# Patient Record
Sex: FEMALE | Race: WHITE | NOT HISPANIC OR LATINO | Employment: OTHER | URBAN - METROPOLITAN AREA
[De-identification: names, ages, dates, MRNs, and addresses within clinical notes are randomized per-mention and may not be internally consistent; named-entity substitution may affect disease eponyms.]

---

## 2023-06-09 ENCOUNTER — APPOINTMENT (OUTPATIENT)
Dept: CT IMAGING | Facility: CLINIC | Age: 78
DRG: 085 | End: 2023-06-09
Attending: EMERGENCY MEDICINE
Payer: MEDICARE

## 2023-06-09 ENCOUNTER — HOSPITAL ENCOUNTER (INPATIENT)
Facility: CLINIC | Age: 78
LOS: 1 days | Discharge: OTHER INSTITUTION | DRG: 085 | End: 2023-06-10
Attending: EMERGENCY MEDICINE | Admitting: INTERNAL MEDICINE
Payer: MEDICARE

## 2023-06-09 ENCOUNTER — APPOINTMENT (OUTPATIENT)
Dept: GENERAL RADIOLOGY | Facility: CLINIC | Age: 78
DRG: 085 | End: 2023-06-09
Attending: EMERGENCY MEDICINE
Payer: MEDICARE

## 2023-06-09 ENCOUNTER — HOSPITAL ENCOUNTER (EMERGENCY)
Facility: CLINIC | Age: 78
Discharge: HOME | DRG: 085 | End: 2023-06-09
Payer: MEDICARE

## 2023-06-09 DIAGNOSIS — S00.83XA CONTUSION OF FACE, INITIAL ENCOUNTER: ICD-10-CM

## 2023-06-09 DIAGNOSIS — S02.30XA ORBITAL FLOOR (BLOW-OUT) CLOSED FRACTURE (H): ICD-10-CM

## 2023-06-09 DIAGNOSIS — I60.9 SUBARACHNOID HEMORRHAGE (H): ICD-10-CM
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
## 2023-06-09 LAB
ABO/RH(D): NORMAL
ANION GAP SERPL CALCULATED.3IONS-SCNC: 13 MMOL/L (ref 7–15)
ANTIBODY SCREEN: NEGATIVE
BASOPHILS # BLD AUTO: 0.1 10E3/UL (ref 0–0.2)
BASOPHILS NFR BLD AUTO: 2 %
BUN SERPL-MCNC: 24.6 MG/DL (ref 8–23)
CALCIUM SERPL-MCNC: 9.6 MG/DL (ref 8.8–10.2)
CHLORIDE SERPL-SCNC: 98 MMOL/L (ref 98–107)
CREAT SERPL-MCNC: 1.34 MG/DL (ref 0.51–0.95)
DEPRECATED HCO3 PLAS-SCNC: 24 MMOL/L (ref 22–29)
EOSINOPHIL # BLD AUTO: 0 10E3/UL (ref 0–0.7)
EOSINOPHIL NFR BLD AUTO: 1 %
ERYTHROCYTE [DISTWIDTH] IN BLOOD BY AUTOMATED COUNT: 12.7 % (ref 10–15)
GFR SERPL CREATININE-BSD FRML MDRD: 41 ML/MIN/1.73M2
GLUCOSE SERPL-MCNC: 118 MG/DL (ref 70–99)
HCT VFR BLD AUTO: 29.7 % (ref 35–47)
HGB BLD-MCNC: 10.4 G/DL (ref 11.7–15.7)
IMM GRANULOCYTES # BLD: 0 10E3/UL
IMM GRANULOCYTES NFR BLD: 0 %
INR PPP: 1.03 (ref 0.85–1.15)
LYMPHOCYTES # BLD AUTO: 0.6 10E3/UL (ref 0.8–5.3)
LYMPHOCYTES NFR BLD AUTO: 19 %
MCH RBC QN AUTO: 42.1 PG (ref 26.5–33)
MCHC RBC AUTO-ENTMCNC: 35 G/DL (ref 31.5–36.5)
MCV RBC AUTO: 120 FL (ref 78–100)
MONOCYTES # BLD AUTO: 0.8 10E3/UL (ref 0–1.3)
MONOCYTES NFR BLD AUTO: 23 %
NEUTROPHILS # BLD AUTO: 1.8 10E3/UL (ref 1.6–8.3)
NEUTROPHILS NFR BLD AUTO: 55 %
NRBC # BLD AUTO: 0 10E3/UL
NRBC BLD AUTO-RTO: 0 /100
PLAT MORPH BLD: NORMAL
PLATELET # BLD AUTO: 104 10E3/UL (ref 150–450)
POTASSIUM SERPL-SCNC: 3.9 MMOL/L (ref 3.4–5.3)
RBC # BLD AUTO: 2.47 10E6/UL (ref 3.8–5.2)
RBC MORPH BLD: NORMAL
SODIUM SERPL-SCNC: 135 MMOL/L (ref 136–145)
SPECIMEN EXPIRATION DATE: NORMAL
WBC # BLD AUTO: 3.3 10E3/UL (ref 4–11)

## 2023-06-09 PROCEDURE — 71045 X-RAY EXAM CHEST 1 VIEW: CPT

## 2023-06-09 PROCEDURE — 120N000001 HC R&B MED SURG/OB

## 2023-06-09 PROCEDURE — 72170 X-RAY EXAM OF PELVIS: CPT

## 2023-06-09 PROCEDURE — 70450 CT HEAD/BRAIN W/O DYE: CPT

## 2023-06-09 PROCEDURE — 86850 RBC ANTIBODY SCREEN: CPT | Performed by: EMERGENCY MEDICINE

## 2023-06-09 PROCEDURE — 250N000011 HC RX IP 250 OP 636: Performed by: PHYSICIAN ASSISTANT

## 2023-06-09 PROCEDURE — 85610 PROTHROMBIN TIME: CPT | Performed by: EMERGENCY MEDICINE

## 2023-06-09 PROCEDURE — 72125 CT NECK SPINE W/O DYE: CPT

## 2023-06-09 PROCEDURE — 85025 COMPLETE CBC W/AUTO DIFF WBC: CPT | Performed by: EMERGENCY MEDICINE

## 2023-06-09 PROCEDURE — 250N000013 HC RX MED GY IP 250 OP 250 PS 637: Performed by: PHYSICIAN ASSISTANT

## 2023-06-09 PROCEDURE — 73560 X-RAY EXAM OF KNEE 1 OR 2: CPT | Mod: RT

## 2023-06-09 PROCEDURE — 86901 BLOOD TYPING SEROLOGIC RH(D): CPT | Performed by: EMERGENCY MEDICINE

## 2023-06-09 PROCEDURE — 96375 TX/PRO/DX INJ NEW DRUG ADDON: CPT

## 2023-06-09 PROCEDURE — 258N000003 HC RX IP 258 OP 636: Performed by: PHYSICIAN ASSISTANT

## 2023-06-09 PROCEDURE — 250N000011 HC RX IP 250 OP 636: Performed by: EMERGENCY MEDICINE

## 2023-06-09 PROCEDURE — 99223 1ST HOSP IP/OBS HIGH 75: CPT | Performed by: PHYSICIAN ASSISTANT

## 2023-06-09 PROCEDURE — 80048 BASIC METABOLIC PNL TOTAL CA: CPT | Performed by: EMERGENCY MEDICINE

## 2023-06-09 PROCEDURE — 36415 COLL VENOUS BLD VENIPUNCTURE: CPT | Performed by: EMERGENCY MEDICINE

## 2023-06-09 PROCEDURE — 99418 PROLNG IP/OBS E/M EA 15 MIN: CPT | Performed by: PHYSICIAN ASSISTANT

## 2023-06-09 PROCEDURE — 70486 CT MAXILLOFACIAL W/O DYE: CPT

## 2023-06-09 PROCEDURE — 99291 CRITICAL CARE FIRST HOUR: CPT | Mod: 25

## 2023-06-09 PROCEDURE — 72131 CT LUMBAR SPINE W/O DYE: CPT

## 2023-06-09 PROCEDURE — 96374 THER/PROPH/DIAG INJ IV PUSH: CPT

## 2023-06-09 PROCEDURE — 96376 TX/PRO/DX INJ SAME DRUG ADON: CPT

## 2023-06-09 RX ORDER — HYDROMORPHONE HYDROCHLORIDE 1 MG/ML
.3-.5 INJECTION, SOLUTION INTRAMUSCULAR; INTRAVENOUS; SUBCUTANEOUS
Status: DISCONTINUED | OUTPATIENT
Start: 2023-06-09 | End: 2023-06-09

## 2023-06-09 RX ORDER — ATENOLOL 25 MG/1
25 TABLET ORAL DAILY
COMMUNITY

## 2023-06-09 RX ORDER — LOSARTAN POTASSIUM 50 MG/1
50 TABLET ORAL DAILY
COMMUNITY

## 2023-06-09 RX ORDER — CALCIUM CARBONATE 500 MG/1
1000 TABLET, CHEWABLE ORAL 4 TIMES DAILY PRN
Status: DISCONTINUED | OUTPATIENT
Start: 2023-06-09 | End: 2023-06-10 | Stop reason: HOSPADM

## 2023-06-09 RX ORDER — LIDOCAINE 4 G/G
2 PATCH TOPICAL
Status: DISCONTINUED | OUTPATIENT
Start: 2023-06-09 | End: 2023-06-10 | Stop reason: HOSPADM

## 2023-06-09 RX ORDER — AMOXICILLIN 250 MG
1 CAPSULE ORAL 2 TIMES DAILY PRN
Status: DISCONTINUED | OUTPATIENT
Start: 2023-06-09 | End: 2023-06-10 | Stop reason: HOSPADM

## 2023-06-09 RX ORDER — DOXYCYCLINE 100 MG/1
100 CAPSULE ORAL 2 TIMES DAILY
COMMUNITY
End: 2023-06-09

## 2023-06-09 RX ORDER — LIDOCAINE 40 MG/G
CREAM TOPICAL
Status: DISCONTINUED | OUTPATIENT
Start: 2023-06-09 | End: 2023-06-10 | Stop reason: HOSPADM

## 2023-06-09 RX ORDER — GLUCOSAMINE/D3/BOSWELLIA SERRA 1500MG-400
1 TABLET ORAL 2 TIMES DAILY
Status: DISCONTINUED | OUTPATIENT
Start: 2023-06-09 | End: 2023-06-10 | Stop reason: HOSPADM

## 2023-06-09 RX ORDER — ACETAMINOPHEN 325 MG/1
650 TABLET ORAL EVERY 6 HOURS PRN
Status: DISCONTINUED | OUTPATIENT
Start: 2023-06-09 | End: 2023-06-10 | Stop reason: HOSPADM

## 2023-06-09 RX ORDER — VITAMIN B COMPLEX
25 TABLET ORAL DAILY
Status: DISCONTINUED | OUTPATIENT
Start: 2023-06-10 | End: 2023-06-10 | Stop reason: HOSPADM

## 2023-06-09 RX ORDER — ONDANSETRON 2 MG/ML
4 INJECTION INTRAMUSCULAR; INTRAVENOUS EVERY 6 HOURS PRN
Status: DISCONTINUED | OUTPATIENT
Start: 2023-06-09 | End: 2023-06-10 | Stop reason: HOSPADM

## 2023-06-09 RX ORDER — ONDANSETRON 2 MG/ML
4 INJECTION INTRAMUSCULAR; INTRAVENOUS ONCE
Status: COMPLETED | OUTPATIENT
Start: 2023-06-09 | End: 2023-06-09

## 2023-06-09 RX ORDER — ATENOLOL 25 MG/1
25 TABLET ORAL DAILY
Status: DISCONTINUED | OUTPATIENT
Start: 2023-06-10 | End: 2023-06-10 | Stop reason: HOSPADM

## 2023-06-09 RX ORDER — MULTIVITAMIN WITH IRON
250 TABLET ORAL DAILY
Status: DISCONTINUED | OUTPATIENT
Start: 2023-06-10 | End: 2023-06-10 | Stop reason: HOSPADM

## 2023-06-09 RX ORDER — SODIUM CHLORIDE, SODIUM LACTATE, POTASSIUM CHLORIDE, CALCIUM CHLORIDE 600; 310; 30; 20 MG/100ML; MG/100ML; MG/100ML; MG/100ML
INJECTION, SOLUTION INTRAVENOUS CONTINUOUS
Status: DISCONTINUED | OUTPATIENT
Start: 2023-06-09 | End: 2023-06-10 | Stop reason: HOSPADM

## 2023-06-09 RX ORDER — VERAPAMIL HYDROCHLORIDE 180 MG/1
180 TABLET, EXTENDED RELEASE ORAL AT BEDTIME
Status: DISCONTINUED | OUTPATIENT
Start: 2023-06-09 | End: 2023-06-10 | Stop reason: HOSPADM

## 2023-06-09 RX ORDER — HYDROMORPHONE HCL IN WATER/PF 6 MG/30 ML
0.2 PATIENT CONTROLLED ANALGESIA SYRINGE INTRAVENOUS
Status: DISCONTINUED | OUTPATIENT
Start: 2023-06-09 | End: 2023-06-10 | Stop reason: HOSPADM

## 2023-06-09 RX ORDER — HYDROMORPHONE HYDROCHLORIDE 2 MG/1
2 TABLET ORAL EVERY 4 HOURS PRN
Status: DISCONTINUED | OUTPATIENT
Start: 2023-06-09 | End: 2023-06-10 | Stop reason: HOSPADM

## 2023-06-09 RX ORDER — ONDANSETRON 4 MG/1
4 TABLET, ORALLY DISINTEGRATING ORAL EVERY 6 HOURS PRN
Status: DISCONTINUED | OUTPATIENT
Start: 2023-06-09 | End: 2023-06-10 | Stop reason: HOSPADM

## 2023-06-09 RX ORDER — LANOLIN ALCOHOL/MO/W.PET/CERES
1000 CREAM (GRAM) TOPICAL AT BEDTIME
Status: DISCONTINUED | OUTPATIENT
Start: 2023-06-09 | End: 2023-06-10 | Stop reason: HOSPADM

## 2023-06-09 RX ORDER — POLYETHYLENE GLYCOL 3350 17 G/17G
17 POWDER, FOR SOLUTION ORAL DAILY PRN
Status: DISCONTINUED | OUTPATIENT
Start: 2023-06-09 | End: 2023-06-10 | Stop reason: HOSPADM

## 2023-06-09 RX ORDER — VERAPAMIL HYDROCHLORIDE 180 MG/1
180 TABLET, EXTENDED RELEASE ORAL AT BEDTIME
COMMUNITY

## 2023-06-09 RX ORDER — LANOLIN ALCOHOL/MO/W.PET/CERES
800 CREAM (GRAM) TOPICAL 2 TIMES DAILY
Status: DISCONTINUED | OUTPATIENT
Start: 2023-06-09 | End: 2023-06-10 | Stop reason: HOSPADM

## 2023-06-09 RX ORDER — LOSARTAN POTASSIUM 50 MG/1
50 TABLET ORAL AT BEDTIME
Status: DISCONTINUED | OUTPATIENT
Start: 2023-06-09 | End: 2023-06-10 | Stop reason: HOSPADM

## 2023-06-09 RX ORDER — FERROUS SULFATE 325(65) MG
325 TABLET ORAL 2 TIMES DAILY
Status: DISCONTINUED | OUTPATIENT
Start: 2023-06-10 | End: 2023-06-10 | Stop reason: HOSPADM

## 2023-06-09 RX ORDER — TRIAMTERENE/HYDROCHLOROTHIAZID 37.5-25 MG
1 TABLET ORAL DAILY
COMMUNITY

## 2023-06-09 RX ORDER — CHLORAL HYDRATE 500 MG
1 CAPSULE ORAL EVERY MORNING
COMMUNITY

## 2023-06-09 RX ORDER — PROCHLORPERAZINE MALEATE 5 MG
5 TABLET ORAL EVERY 6 HOURS PRN
Status: DISCONTINUED | OUTPATIENT
Start: 2023-06-09 | End: 2023-06-10 | Stop reason: HOSPADM

## 2023-06-09 RX ORDER — ASPIRIN 81 MG/1
81 TABLET ORAL DAILY
Status: ON HOLD | COMMUNITY
End: 2023-06-12

## 2023-06-09 RX ORDER — ACETAMINOPHEN 650 MG/1
650 SUPPOSITORY RECTAL EVERY 6 HOURS PRN
Status: DISCONTINUED | OUTPATIENT
Start: 2023-06-09 | End: 2023-06-10 | Stop reason: HOSPADM

## 2023-06-09 RX ORDER — ASCORBIC ACID 500 MG
500 TABLET ORAL AT BEDTIME
Status: DISCONTINUED | OUTPATIENT
Start: 2023-06-09 | End: 2023-06-10 | Stop reason: HOSPADM

## 2023-06-09 RX ORDER — MULTIVIT-MIN/IRON/FOLIC ACID/K 18-600-40
500 CAPSULE ORAL AT BEDTIME
COMMUNITY

## 2023-06-09 RX ORDER — LABETALOL HYDROCHLORIDE 5 MG/ML
10 INJECTION, SOLUTION INTRAVENOUS
Status: DISCONTINUED | OUTPATIENT
Start: 2023-06-09 | End: 2023-06-10 | Stop reason: HOSPADM

## 2023-06-09 RX ORDER — GLUCOSAMINE/D3/BOSWELLIA SERRA 1500MG-400
1 TABLET ORAL 2 TIMES DAILY
COMMUNITY

## 2023-06-09 RX ORDER — MULTIVITAMIN WITH IRON
1 TABLET ORAL DAILY
COMMUNITY

## 2023-06-09 RX ORDER — PROCHLORPERAZINE 25 MG
12.5 SUPPOSITORY, RECTAL RECTAL EVERY 12 HOURS PRN
Status: DISCONTINUED | OUTPATIENT
Start: 2023-06-09 | End: 2023-06-10 | Stop reason: HOSPADM

## 2023-06-09 RX ORDER — HYDROMORPHONE HCL IN WATER/PF 6 MG/30 ML
0.4 PATIENT CONTROLLED ANALGESIA SYRINGE INTRAVENOUS
Status: DISCONTINUED | OUTPATIENT
Start: 2023-06-09 | End: 2023-06-10 | Stop reason: HOSPADM

## 2023-06-09 RX ORDER — TETRACAINE HYDROCHLORIDE 5 MG/ML
SOLUTION OPHTHALMIC
Status: DISCONTINUED
Start: 2023-06-09 | End: 2023-06-10 | Stop reason: HOSPADM

## 2023-06-09 RX ORDER — FERROUS SULFATE 325(65) MG
325 TABLET ORAL 2 TIMES DAILY
COMMUNITY

## 2023-06-09 RX ORDER — AMOXICILLIN 250 MG
2 CAPSULE ORAL 2 TIMES DAILY PRN
Status: DISCONTINUED | OUTPATIENT
Start: 2023-06-09 | End: 2023-06-10 | Stop reason: HOSPADM

## 2023-06-09 RX ORDER — TETRACAINE HYDROCHLORIDE 5 MG/ML
SOLUTION OPHTHALMIC
Status: COMPLETED
Start: 2023-06-09 | End: 2023-06-09

## 2023-06-09 RX ORDER — UBIDECARENONE 100 MG
100 CAPSULE ORAL DAILY
COMMUNITY

## 2023-06-09 RX ORDER — LEVETIRACETAM 500 MG/1
500 TABLET ORAL 2 TIMES DAILY
Status: DISCONTINUED | OUTPATIENT
Start: 2023-06-09 | End: 2023-06-10 | Stop reason: HOSPADM

## 2023-06-09 RX ORDER — LANOLIN ALCOHOL/MO/W.PET/CERES
1000 CREAM (GRAM) TOPICAL DAILY
COMMUNITY

## 2023-06-09 RX ORDER — ONDANSETRON 2 MG/ML
4 INJECTION INTRAMUSCULAR; INTRAVENOUS EVERY 30 MIN PRN
Status: DISCONTINUED | OUTPATIENT
Start: 2023-06-09 | End: 2023-06-09

## 2023-06-09 RX ORDER — DOXYCYCLINE 100 MG/1
100 CAPSULE ORAL DAILY PRN
COMMUNITY

## 2023-06-09 RX ADMIN — CYANOCOBALAMIN TAB 1000 MCG 1000 MCG: 1000 TAB at 23:08

## 2023-06-09 RX ADMIN — HYDROMORPHONE HYDROCHLORIDE 0.5 MG: 1 INJECTION, SOLUTION INTRAMUSCULAR; INTRAVENOUS; SUBCUTANEOUS at 16:49

## 2023-06-09 RX ADMIN — LOSARTAN POTASSIUM 50 MG: 50 TABLET, FILM COATED ORAL at 22:54

## 2023-06-09 RX ADMIN — FOLIC ACID TAB 400 MCG 800 MCG: 400 TAB at 22:54

## 2023-06-09 RX ADMIN — HYDROMORPHONE HYDROCHLORIDE 0.4 MG: 0.2 INJECTION, SOLUTION INTRAMUSCULAR; INTRAVENOUS; SUBCUTANEOUS at 21:54

## 2023-06-09 RX ADMIN — AMOXICILLIN AND CLAVULANATE POTASSIUM 1 TABLET: 875; 125 TABLET, FILM COATED ORAL at 22:54

## 2023-06-09 RX ADMIN — LEVETIRACETAM 500 MG: 500 TABLET, FILM COATED ORAL at 21:29

## 2023-06-09 RX ADMIN — SODIUM CHLORIDE, POTASSIUM CHLORIDE, SODIUM LACTATE AND CALCIUM CHLORIDE: 600; 310; 30; 20 INJECTION, SOLUTION INTRAVENOUS at 22:08

## 2023-06-09 RX ADMIN — VERAPAMIL HYDROCHLORIDE 180 MG: 180 TABLET, FILM COATED, EXTENDED RELEASE ORAL at 23:39

## 2023-06-09 RX ADMIN — OXYCODONE HYDROCHLORIDE AND ACETAMINOPHEN 500 MG: 500 TABLET ORAL at 23:38

## 2023-06-09 RX ADMIN — LIDOCAINE PATCH 4% 2 PATCH: 40 PATCH TOPICAL at 22:54

## 2023-06-09 RX ADMIN — HYDROMORPHONE HYDROCHLORIDE 0.5 MG: 1 INJECTION, SOLUTION INTRAMUSCULAR; INTRAVENOUS; SUBCUTANEOUS at 18:37

## 2023-06-09 ASSESSMENT — ACTIVITIES OF DAILY LIVING (ADL)
ADLS_ACUITY_SCORE: 35
ADLS_ACUITY_SCORE: 36
ADLS_ACUITY_SCORE: 35

## 2023-06-09 NOTE — ED TRIAGE NOTES
Pt reports that she tripped and fell in her driveway while getting the mail. Pt neighbor called 911 because they saw pt laying I the driveway. Pt isn't sure how long she was on the ground. Pt denies LOC or vomiting. Pt  Has swelling to her face. Dried blood to face. EMS placed C collar. Pt alert. Family present in room.     Pt family reports that pt has hx breast cancer and has chemo every month.

## 2023-06-09 NOTE — ED NOTES
Bed: [facility]  Expected date:   Expected time:   Means of arrival:   Comments:  Ceferino a randi

## 2023-06-09 NOTE — ED PROVIDER NOTES
History     Chief Complaint:  Fall and Facial Injury       The history is provided by the patient and a relative.      Argelia Villarreal is a 78 year old female with a history or stage 4 metastatic breast cancer currently on chemotherapy who presents with two family member via EMS after a fall. Patient was getting her mail today when she fell in her driveway face first. Her pain is most intense in her face and right knee. Pain is 10/10. She denies pain to her arms, chest, or abdomen. Patient denies new back pain. She feels as though her teeth align. Patient notes new onset of generalized weakness since the fall. She denies numbness or tingling to her bilateral extremities. Patient lives alone. She endorses drinking a glass of wine at night and denies tobacco use. Patient is on a course of Ibrance that she takes 21 days on and 7 days off, and her female  notes that she is currently on the 7 days off. She takes a baby aspirin for anticoagulation.    Independent Historian:   Female relative at bedside reports history as stated above.    Review of External Notes:   See MDM    Medications:    Atenolol   Losartan   Palbociclib   Triamterene-HCTZ  Verapamil  Ibrance     Past Medical History:    Metastatic breast cancer     Physical Exam     Patient Vitals for the past 24 hrs:   BP Temp Temp src Pulse Resp SpO2   06/09/23 2000 125/56 -- -- 64 11 100 %   06/09/23 1930 133/55 -- -- 64 12 99 %   06/09/23 1925 -- -- -- 61 12 94 %   06/09/23 1924 -- -- -- 62 12 93 %   06/09/23 1923 -- -- -- 62 11 91 %   06/09/23 1922 -- -- -- 63 12 94 %   06/09/23 1921 -- -- -- 67 13 93 %   06/09/23 1920 -- -- -- 68 12 95 %   06/09/23 1919 -- -- -- 68 11 95 %   06/09/23 1918 -- -- -- 67 12 94 %   06/09/23 1915 (!) 152/60 -- -- 68 13 93 %   06/09/23 1900 -- -- -- 67 10 94 %   06/09/23 1845 -- -- -- 74 11 95 %   06/09/23 1830 -- -- -- 79 14 96 %   06/09/23 1826 (!) 164/70 -- -- 69 -- 93 %   06/09/23 1700 139/66 -- -- 63 -- 93 %    06/09/23 1608 (!) 162/67 -- -- 67 -- 94 %   06/09/23 1603 -- 98  F (36.7  C) Temporal -- -- --   06/09/23 1602 -- -- -- 68 -- --   06/09/23 1600 (!) 185/42 -- -- -- 18 95 %   06/09/23 1559 -- -- -- -- -- 94 %        Physical Exam  Vital signs reviewed.  Nursing note reviewed.  Constitutional: Well-developed, Well-nourished.  Non-diaphoretic.  Mild distress    HENT: Significant left facial trauma with left periorbital and maxillary swelling, abrasions on nasal bridge and forehead.  Mouth without any obvious lacerations or tooth injuries  EYES:  PERRL.  EOMI, no obvious entrapment on exam, no evidence of globe rupture, L eye 15 mmHg x2  NECK:  No Cspine tenderness.  Pain with attempted ROM  CARDIAC:  RRR. 2+ bilat radial pulses   CHEST:  chest NT  PULM: Effort  Normal.  Breath sounds clear and equal bilat  ABD:  Soft, NT/ND  No guarding, no rebound.  No external evidence of abdominal trauma  : No CVA T.    BACK:  No T or L spinous process tenderness.  Pelvis stable.  EXT:  Full ROM X4.  No tenderness, edema, crepitus or obvious deformity other than that noted below              RLE: Range of motion some limited by pain, anterior knee tenderness without obvious deformity, distal CMS intact  NEURO:  Alert, Oriented.  Strength testing symmetrical, 5/5 bilateral  strength, dorsi and plantarflexion.  Limited hip flexion strength testing secondary to pain. Sensation intact to LT.   SKIN :  Warm.  Dry.   No erythema.  No rash  PSYCH.:  Normal judgment.  Normal affect.      Emergency Department Course     Imaging:  XR Chest 1 View   Final Result   IMPRESSION: Diffuse sclerosis of the visualized bony skeleton. Advanced degenerative changes most marked in the right shoulder. Mild thoracic spinal curvature. Surgical clips projected over the lateral aspect of the mid right chest wall. No obvious acute    finding such as pneumothorax or displaced bony fracture.      XR Pelvis 1/2 Views   Final Result   IMPRESSION: Diffuse  sclerosis and heterogeneity of the bone is consistent with the patient's known history of metastatic breast cancer. There is a left total hip replacement. There is no evidence of an acute displaced fracture or dislocation on the    single AP view. Atherosclerotic vascular calcifications.      XR Knee Right 1/2 Views   Final Result   IMPRESSION: Diffuse sclerosis and heterogeneity of the bones consistent with the patient's known history of metastatic breast cancer. Mild tricompartmental degenerative changes of the right knee. No evidence of a fracture or joint effusion.    Atherosclerotic vascular calcifications.      Lumbar spine CT w/o contrast   Final Result   IMPRESSION:   1.  No acute lumbar spine fracture.   2.  Widespread blastic metastases.   3.  Degenerative changes with multilevel severe canal stenosis, as detailed above.      Cervical spine CT w/o contrast   Final Result   IMPRESSION:   HEAD CT:   1.  Scattered small volume subarachnoid hemorrhage.    2.  No mass effect, midline shift or calvarial fracture.      FACIAL BONE CT:   1.  Left orbital floor blowout fracture with distorted appearance of the left inferior rectus muscle. This could be related to intramuscular injury or herniation. Clinical correlation for extraocular muscle entrapment is recommended.   2.  Small volume of extraconal hemorrhage along the floor of the left orbit. This does not extend to the orbital apex.   3.  Posttraumatic hemorrhage in the left maxillary sinus and left periorbital soft tissue hematoma.      CERVICAL SPINE CT:   1.  No acute cervical spine fracture.   2.  Widespread osseous metastases.      Critical Result: Acute intracranial hemorrhage and possible left inferior rectus muscle entrapment      Finding was identified on 6/9/2023 6:08 PM CDT.      Dr. Drew Henry was contacted by me on 6/9/2023 6:14 PM CDT.      CT Facial Bones without Contrast   Final Result   IMPRESSION:   HEAD CT:   1.  Scattered small  volume subarachnoid hemorrhage.    2.  No mass effect, midline shift or calvarial fracture.      FACIAL BONE CT:   1.  Left orbital floor blowout fracture with distorted appearance of the left inferior rectus muscle. This could be related to intramuscular injury or herniation. Clinical correlation for extraocular muscle entrapment is recommended.   2.  Small volume of extraconal hemorrhage along the floor of the left orbit. This does not extend to the orbital apex.   3.  Posttraumatic hemorrhage in the left maxillary sinus and left periorbital soft tissue hematoma.      CERVICAL SPINE CT:   1.  No acute cervical spine fracture.   2.  Widespread osseous metastases.      Critical Result: Acute intracranial hemorrhage and possible left inferior rectus muscle entrapment      Finding was identified on 6/9/2023 6:08 PM CDT.      Dr. Drew Henry was contacted by me on 6/9/2023 6:14 PM CDT.      Head CT w/o contrast   Final Result   IMPRESSION:   HEAD CT:   1.  Scattered small volume subarachnoid hemorrhage.    2.  No mass effect, midline shift or calvarial fracture.      FACIAL BONE CT:   1.  Left orbital floor blowout fracture with distorted appearance of the left inferior rectus muscle. This could be related to intramuscular injury or herniation. Clinical correlation for extraocular muscle entrapment is recommended.   2.  Small volume of extraconal hemorrhage along the floor of the left orbit. This does not extend to the orbital apex.   3.  Posttraumatic hemorrhage in the left maxillary sinus and left periorbital soft tissue hematoma.      CERVICAL SPINE CT:   1.  No acute cervical spine fracture.   2.  Widespread osseous metastases.      Critical Result: Acute intracranial hemorrhage and possible left inferior rectus muscle entrapment      Finding was identified on 6/9/2023 6:08 PM CDT.      Dr. Drew Henry was contacted by me on 6/9/2023 6:14 PM CDT.         Report per radiology    Laboratory:  Labs  Ordered and Resulted from Time of ED Arrival to Time of ED Departure   BASIC METABOLIC PANEL - Abnormal       Result Value    Sodium 135 (*)     Potassium 3.9      Chloride 98      Carbon Dioxide (CO2) 24      Anion Gap 13      Urea Nitrogen 24.6 (*)     Creatinine 1.34 (*)     Calcium 9.6      Glucose 118 (*)     GFR Estimate 41 (*)    CBC WITH PLATELETS AND DIFFERENTIAL - Abnormal    WBC Count 3.3 (*)     RBC Count 2.47 (*)     Hemoglobin 10.4 (*)     Hematocrit 29.7 (*)      (*)     MCH 42.1 (*)     MCHC 35.0      RDW 12.7      Platelet Count 104 (*)     % Neutrophils 55      % Lymphocytes 19      % Monocytes 23      % Eosinophils 1      % Basophils 2      % Immature Granulocytes 0      NRBCs per 100 WBC 0      Absolute Neutrophils 1.8      Absolute Lymphocytes 0.6 (*)     Absolute Monocytes 0.8      Absolute Eosinophils 0.0      Absolute Basophils 0.1      Absolute Immature Granulocytes 0.0      Absolute NRBCs 0.0     INR - Normal    INR 1.03     RBC AND PLATELET MORPHOLOGY    Platelet Assessment        Value: Automated Count Confirmed. Platelet morphology is normal.    RBC Morphology Confirmed RBC Indices     TYPE AND SCREEN, ADULT    ABO/RH(D) A POS      Antibody Screen Negative      SPECIMEN EXPIRATION DATE [id number]     ABO/RH TYPE AND SCREEN        Emergency Department Course & Assessments:     Interventions:  Medications   ondansetron (ZOFRAN) injection 4 mg (has no administration in time range)   HYDROmorphone (PF) (DILAUDID) injection 0.3-0.5 mg (0.5 mg Intravenous $Given 6/9/23 1837)   levETIRAcetam (KEPPRA) tablet 500 mg (has no administration in time range)   ondansetron (ZOFRAN) injection 4 mg (4 mg Intravenous Not Given 6/9/23 1833)      Assessments:  1628 I obtained history and examined the patient as noted above.   1825 I rechecked and updated the patient.    1945 I rechecked and updated the patient. Patient is compliant with plans for admission.    Independent Interpretation (X-rays,  CTs, rhythm strip):  See MDM    Consultations/Discussion of Management or Tests:   I consulted with [facility].     I consulted with Dr. Boyd from ophthalmology.    I consulted with Dr. Dawn from ENT.    I consulted with Dr. Valiente from neurosurgery.   I consulted with Chloe Sapp PA-C from hospitalist services. Patient will be admitted under Dr. Razo.       Social Determinants of Health affecting care:   See MDM    Disposition:  The patient was admitted to the hospital under the care of Dr. Razo.     Impression & Plan    Medical Decision Makin year old female presenting w/ head and face injury s/p mechanical fall     Social determinants affecting patient's health include:  Age potentially increasing risk for presentation to the emergency department and consideration of independent living status     I reviewed medical records from  2022 family medicine office visit for an overview of the patient's medical history     DDx includes mechanical fall, fracture, contusion, intracranial hemorrhage, skull fracture.  Doubt seizure, syncope, CVA given history and physical exam.  No other evidence of trauma on full primary and secondary trauma surveys other than areas imaged above or documented in physical exam.  Given mechanical fall and no other complaints, EKG and blood work deferred initially. Imaging sig for traumatic subarachnoid hemorrhage as described above as well as an orbital blowout fracture with findings concerning for entrapment.  Extraocular movements are clinically intact although exam is somewhat limited due to the degree of periorbital swelling.  Patient is able to discern light and count fingers using her left eye.  Interventions as noted above chest x-ray without evidence of pneumothorax on my independent interpretation.  No obvious acute osseous abnormality on pelvic x-ray my independent interpretation.  Radiology interpretation of radiographs as noted  above.  I discussed patient with [facility] ophthalmology for further recommendations regarding the patient's orbital fracture.  Given reassuring exam, ophthalmology recommendations that the patient may be able to follow-up as an outpatient and does not need emergent surgical intervention.  I discussed the patient with on-call ENT at [facility] as well who concur.  I also discussed the patient with on-call neurosurgery who recommended the patient is safe to stay at [facility] given her reassuring mental status with plan for follow-up CT in the morning for reevaluation of subarachnoid hemorrhage.  Patient was subsequently admitted to the hospitalist service for monitoring, pain control and repeat head CT.  Pt and family counseled on all results, disposition and diagnosis.  They are understanding and agreeable to plan. Patient admitted in guarded condition.     Critical care time: 35 minutes excluding procedures    2340: I discussed patient with the admitting service who notified me that they discussed patient with ophthalmology and that the admission at [facility] was canceled due to ophthalmology wanting to evaluate the patient at the [facility].  Discussed patient with ophthalmology so that the patient may be transferred in the morning and then eventually recommended transferring to the [facility] for further evaluation.  I discussed patient with emergency department staff at [facility] who accepted the patient for evaluation to the emergency department.  The patient and her family were counseled on the change in disposition and were understanding and agreeable.    Diagnosis:    ICD-10-CM    1. Subarachnoid hemorrhage (H)  I60.9       2. Orbital floor (blow-out) closed fracture (H)  S02.30XA       3. Contusion of face, initial encounter  S00.83XA       4. Fall, initial encounter  W19.XXXA          Scribe Disclosure:  TRESA SERRANO, am serving as a  scribe at 4:14 PM on 6/9/2023 to document services personally performed by Drew Henry MD based on my observations and the provider's statements to me.     6/9/2023   Drew Henry MD Vaughn, Christopher E, MD  06/09/23 2031       Drew Henry MD  06/10/23 0001

## 2023-06-10 ENCOUNTER — HOSPITAL ENCOUNTER (INPATIENT)
Facility: CLINIC | Age: 78
LOS: 3 days | Discharge: HOME HEALTH | DRG: 124 | End: 2023-06-13
Attending: EMERGENCY MEDICINE | Admitting: SURGERY
Payer: MEDICARE

## 2023-06-10 ENCOUNTER — APPOINTMENT (OUTPATIENT)
Dept: CT IMAGING | Facility: CLINIC | Age: 78
DRG: 124 | End: 2023-06-10
Attending: EMERGENCY MEDICINE
Payer: MEDICARE

## 2023-06-10 VITALS
DIASTOLIC BLOOD PRESSURE: 66 MMHG | RESPIRATION RATE: 16 BRPM | HEART RATE: 60 BPM | SYSTOLIC BLOOD PRESSURE: 133 MMHG | TEMPERATURE: 98 F | OXYGEN SATURATION: 99 % | WEIGHT: 207.23 LBS

## 2023-06-10 DIAGNOSIS — S00.83XA CONTUSION OF FACE, INITIAL ENCOUNTER: ICD-10-CM
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
## 2023-06-10 DIAGNOSIS — I60.9 SAH (SUBARACHNOID HEMORRHAGE) (H): ICD-10-CM

## 2023-06-10 DIAGNOSIS — I60.9 SUBARACHNOID HEMORRHAGE (H): Primary | ICD-10-CM

## 2023-06-10 DIAGNOSIS — S02.30XA ORBITAL FLOOR (BLOW-OUT) CLOSED FRACTURE (H): ICD-10-CM

## 2023-06-10 DIAGNOSIS — S06.6XAA TRAUMATIC SUBARACHNOID HEMORRHAGE WITH UNKNOWN LOSS OF CONSCIOUSNESS STATUS, INITIAL ENCOUNTER (H): ICD-10-CM

## 2023-06-10 DIAGNOSIS — S02.32XA CLOSED BLOW-OUT FRACTURE OF LEFT ORBIT, INITIAL ENCOUNTER (H): ICD-10-CM

## 2023-06-10 LAB
HOLD SPECIMEN: NORMAL

## 2023-06-10 PROCEDURE — 99285 EMERGENCY DEPT VISIT HI MDM: CPT | Mod: 25 | Performed by: EMERGENCY MEDICINE

## 2023-06-10 PROCEDURE — 72128 CT CHEST SPINE W/O DYE: CPT

## 2023-06-10 PROCEDURE — 70450 CT HEAD/BRAIN W/O DYE: CPT | Mod: 76

## 2023-06-10 PROCEDURE — 99207 PR NO BILLABLE SERVICE THIS VISIT: CPT | Performed by: PHYSICIAN ASSISTANT

## 2023-06-10 PROCEDURE — 250N000011 HC RX IP 250 OP 636: Performed by: PHYSICIAN ASSISTANT

## 2023-06-10 PROCEDURE — 99222 1ST HOSP IP/OBS MODERATE 55: CPT | Performed by: NEUROLOGICAL SURGERY

## 2023-06-10 PROCEDURE — 258N000003 HC RX IP 258 OP 636: Performed by: EMERGENCY MEDICINE

## 2023-06-10 PROCEDURE — 250N000012 HC RX MED GY IP 250 OP 636 PS 637: Performed by: STUDENT IN AN ORGANIZED HEALTH CARE EDUCATION/TRAINING PROGRAM

## 2023-06-10 PROCEDURE — 99222 1ST HOSP IP/OBS MODERATE 55: CPT | Mod: AI | Performed by: SURGERY

## 2023-06-10 PROCEDURE — 250N000011 HC RX IP 250 OP 636: Performed by: STUDENT IN AN ORGANIZED HEALTH CARE EDUCATION/TRAINING PROGRAM

## 2023-06-10 PROCEDURE — 250N000009 HC RX 250: Performed by: PHYSICIAN ASSISTANT

## 2023-06-10 PROCEDURE — 85018 HEMOGLOBIN: CPT | Performed by: NURSE PRACTITIONER

## 2023-06-10 PROCEDURE — 250N000013 HC RX MED GY IP 250 OP 250 PS 637: Performed by: PHYSICIAN ASSISTANT

## 2023-06-10 PROCEDURE — 250N000011 HC RX IP 250 OP 636: Performed by: EMERGENCY MEDICINE

## 2023-06-10 PROCEDURE — 72128 CT CHEST SPINE W/O DYE: CPT | Mod: 26 | Performed by: RADIOLOGY

## 2023-06-10 PROCEDURE — 250N000013 HC RX MED GY IP 250 OP 250 PS 637: Performed by: STUDENT IN AN ORGANIZED HEALTH CARE EDUCATION/TRAINING PROGRAM

## 2023-06-10 PROCEDURE — 250N000012 HC RX MED GY IP 250 OP 636 PS 637: Performed by: PHYSICIAN ASSISTANT

## 2023-06-10 PROCEDURE — 36415 COLL VENOUS BLD VENIPUNCTURE: CPT | Performed by: EMERGENCY MEDICINE

## 2023-06-10 PROCEDURE — 70450 CT HEAD/BRAIN W/O DYE: CPT | Mod: 26 | Performed by: RADIOLOGY

## 2023-06-10 PROCEDURE — 70450 CT HEAD/BRAIN W/O DYE: CPT

## 2023-06-10 PROCEDURE — 258N000003 HC RX IP 258 OP 636: Performed by: STUDENT IN AN ORGANIZED HEALTH CARE EDUCATION/TRAINING PROGRAM

## 2023-06-10 PROCEDURE — 120N000003 HC R&B IMCU UMMC

## 2023-06-10 PROCEDURE — 250N000013 HC RX MED GY IP 250 OP 250 PS 637: Performed by: SURGERY

## 2023-06-10 PROCEDURE — 99285 EMERGENCY DEPT VISIT HI MDM: CPT | Performed by: EMERGENCY MEDICINE

## 2023-06-10 RX ORDER — HYDROMORPHONE HCL IN WATER/PF 6 MG/30 ML
0.2 PATIENT CONTROLLED ANALGESIA SYRINGE INTRAVENOUS
Status: DISCONTINUED | OUTPATIENT
Start: 2023-06-10 | End: 2023-06-11

## 2023-06-10 RX ORDER — OXYMETAZOLINE HYDROCHLORIDE 0.05 G/100ML
2 SPRAY NASAL 2 TIMES DAILY
Status: DISCONTINUED | OUTPATIENT
Start: 2023-06-10 | End: 2023-06-11

## 2023-06-10 RX ORDER — METHYLPREDNISOLONE 4 MG/1
4 TABLET ORAL
Status: COMPLETED | OUTPATIENT
Start: 2023-06-11 | End: 2023-06-13

## 2023-06-10 RX ORDER — BISACODYL 10 MG
10 SUPPOSITORY, RECTAL RECTAL DAILY PRN
Status: DISCONTINUED | OUTPATIENT
Start: 2023-06-10 | End: 2023-06-13 | Stop reason: HOSPADM

## 2023-06-10 RX ORDER — VITAMIN B COMPLEX
25 TABLET ORAL DAILY
Status: DISCONTINUED | OUTPATIENT
Start: 2023-06-10 | End: 2023-06-13 | Stop reason: HOSPADM

## 2023-06-10 RX ORDER — NALOXONE HYDROCHLORIDE 0.4 MG/ML
0.4 INJECTION, SOLUTION INTRAMUSCULAR; INTRAVENOUS; SUBCUTANEOUS
Status: DISCONTINUED | OUTPATIENT
Start: 2023-06-10 | End: 2023-06-13 | Stop reason: HOSPADM

## 2023-06-10 RX ORDER — FENTANYL CITRATE 50 UG/ML
50 INJECTION, SOLUTION INTRAMUSCULAR; INTRAVENOUS ONCE
Status: COMPLETED | OUTPATIENT
Start: 2023-06-10 | End: 2023-06-10

## 2023-06-10 RX ORDER — LANOLIN ALCOHOL/MO/W.PET/CERES
800 CREAM (GRAM) TOPICAL 2 TIMES DAILY
Status: DISCONTINUED | OUTPATIENT
Start: 2023-06-10 | End: 2023-06-13 | Stop reason: HOSPADM

## 2023-06-10 RX ORDER — AMOXICILLIN 250 MG
2 CAPSULE ORAL 2 TIMES DAILY
Status: DISCONTINUED | OUTPATIENT
Start: 2023-06-10 | End: 2023-06-13 | Stop reason: HOSPADM

## 2023-06-10 RX ORDER — ACETAMINOPHEN 325 MG/1
975 TABLET ORAL 3 TIMES DAILY
Status: DISCONTINUED | OUTPATIENT
Start: 2023-06-10 | End: 2023-06-13 | Stop reason: HOSPADM

## 2023-06-10 RX ORDER — MULTIVIT-MIN/IRON/FOLIC ACID/K 18-600-40
500 CAPSULE ORAL AT BEDTIME
Status: DISCONTINUED | OUTPATIENT
Start: 2023-06-10 | End: 2023-06-10

## 2023-06-10 RX ORDER — NALOXONE HYDROCHLORIDE 0.4 MG/ML
0.2 INJECTION, SOLUTION INTRAMUSCULAR; INTRAVENOUS; SUBCUTANEOUS
Status: DISCONTINUED | OUTPATIENT
Start: 2023-06-10 | End: 2023-06-13 | Stop reason: HOSPADM

## 2023-06-10 RX ORDER — METHYLPREDNISOLONE 4 MG/1
4 TABLET ORAL
Status: DISCONTINUED | OUTPATIENT
Start: 2023-06-11 | End: 2023-06-13 | Stop reason: HOSPADM

## 2023-06-10 RX ORDER — HYDROMORPHONE HYDROCHLORIDE 2 MG/1
2 TABLET ORAL EVERY 4 HOURS PRN
Status: DISCONTINUED | OUTPATIENT
Start: 2023-06-10 | End: 2023-06-10

## 2023-06-10 RX ORDER — METHYLPREDNISOLONE 8 MG/1
8 TABLET ORAL AT BEDTIME
Status: COMPLETED | OUTPATIENT
Start: 2023-06-10 | End: 2023-06-11

## 2023-06-10 RX ORDER — CALCIUM CARBONATE 500(1250)
500 TABLET ORAL 2 TIMES DAILY WITH MEALS
Status: DISCONTINUED | OUTPATIENT
Start: 2023-06-10 | End: 2023-06-13 | Stop reason: HOSPADM

## 2023-06-10 RX ORDER — SODIUM CHLORIDE 9 MG/ML
INJECTION, SOLUTION INTRAVENOUS CONTINUOUS
Status: DISCONTINUED | OUTPATIENT
Start: 2023-06-10 | End: 2023-06-11

## 2023-06-10 RX ORDER — PREDNISONE 20 MG/1
60 TABLET ORAL ONCE
Status: COMPLETED | OUTPATIENT
Start: 2023-06-10 | End: 2023-06-10

## 2023-06-10 RX ORDER — LIDOCAINE 40 MG/G
CREAM TOPICAL
Status: DISCONTINUED | OUTPATIENT
Start: 2023-06-10 | End: 2023-06-13 | Stop reason: HOSPADM

## 2023-06-10 RX ORDER — GABAPENTIN 100 MG/1
100 CAPSULE ORAL AT BEDTIME
Status: DISCONTINUED | OUTPATIENT
Start: 2023-06-10 | End: 2023-06-10

## 2023-06-10 RX ORDER — MECLIZINE HYDROCHLORIDE 25 MG/1
25 TABLET ORAL EVERY 6 HOURS PRN
Status: DISCONTINUED | OUTPATIENT
Start: 2023-06-10 | End: 2023-06-13 | Stop reason: HOSPADM

## 2023-06-10 RX ORDER — FERROUS SULFATE 325(65) MG
325 TABLET ORAL 2 TIMES DAILY
Status: DISCONTINUED | OUTPATIENT
Start: 2023-06-10 | End: 2023-06-13 | Stop reason: HOSPADM

## 2023-06-10 RX ORDER — METHOCARBAMOL 500 MG/1
500 TABLET, FILM COATED ORAL 4 TIMES DAILY
Status: DISCONTINUED | OUTPATIENT
Start: 2023-06-10 | End: 2023-06-13 | Stop reason: HOSPADM

## 2023-06-10 RX ORDER — SODIUM CHLORIDE, SODIUM LACTATE, POTASSIUM CHLORIDE, CALCIUM CHLORIDE 600; 310; 30; 20 MG/100ML; MG/100ML; MG/100ML; MG/100ML
1000 INJECTION, SOLUTION INTRAVENOUS CONTINUOUS
Status: DISCONTINUED | OUTPATIENT
Start: 2023-06-10 | End: 2023-06-10

## 2023-06-10 RX ORDER — LEVETIRACETAM 750 MG/1
750 TABLET ORAL 2 TIMES DAILY
Status: DISCONTINUED | OUTPATIENT
Start: 2023-06-10 | End: 2023-06-13 | Stop reason: HOSPADM

## 2023-06-10 RX ORDER — PROCHLORPERAZINE MALEATE 5 MG
5 TABLET ORAL EVERY 6 HOURS PRN
Status: DISCONTINUED | OUTPATIENT
Start: 2023-06-10 | End: 2023-06-13 | Stop reason: HOSPADM

## 2023-06-10 RX ORDER — LOSARTAN POTASSIUM 50 MG/1
50 TABLET ORAL DAILY
Status: DISCONTINUED | OUTPATIENT
Start: 2023-06-10 | End: 2023-06-11

## 2023-06-10 RX ORDER — HYDROMORPHONE HYDROCHLORIDE 2 MG/1
4 TABLET ORAL EVERY 4 HOURS PRN
Status: DISCONTINUED | OUTPATIENT
Start: 2023-06-10 | End: 2023-06-10

## 2023-06-10 RX ORDER — METHYLPREDNISOLONE 8 MG/1
8 TABLET ORAL ONCE
Status: DISCONTINUED | OUTPATIENT
Start: 2023-06-10 | End: 2023-06-10

## 2023-06-10 RX ORDER — PROCHLORPERAZINE 25 MG
12.5 SUPPOSITORY, RECTAL RECTAL EVERY 12 HOURS PRN
Status: DISCONTINUED | OUTPATIENT
Start: 2023-06-10 | End: 2023-06-13 | Stop reason: HOSPADM

## 2023-06-10 RX ORDER — MECLIZINE HCL 25MG 25 MG/1
25 TABLET, CHEWABLE ORAL EVERY 6 HOURS PRN
Status: DISCONTINUED | OUTPATIENT
Start: 2023-06-10 | End: 2023-06-10

## 2023-06-10 RX ORDER — ONDANSETRON 4 MG/1
4 TABLET, ORALLY DISINTEGRATING ORAL EVERY 6 HOURS PRN
Status: DISCONTINUED | OUTPATIENT
Start: 2023-06-10 | End: 2023-06-13 | Stop reason: HOSPADM

## 2023-06-10 RX ORDER — HYDROMORPHONE HCL IN WATER/PF 6 MG/30 ML
0.4 PATIENT CONTROLLED ANALGESIA SYRINGE INTRAVENOUS
Status: DISCONTINUED | OUTPATIENT
Start: 2023-06-10 | End: 2023-06-11

## 2023-06-10 RX ORDER — ONDANSETRON 2 MG/ML
4 INJECTION INTRAMUSCULAR; INTRAVENOUS EVERY 6 HOURS PRN
Status: DISCONTINUED | OUTPATIENT
Start: 2023-06-10 | End: 2023-06-13 | Stop reason: HOSPADM

## 2023-06-10 RX ORDER — LIDOCAINE 4 G/G
1 PATCH TOPICAL
Status: DISCONTINUED | OUTPATIENT
Start: 2023-06-10 | End: 2023-06-13 | Stop reason: HOSPADM

## 2023-06-10 RX ORDER — METHYLPREDNISOLONE 4 MG/1
4 TABLET ORAL
Status: COMPLETED | OUTPATIENT
Start: 2023-06-11 | End: 2023-06-12

## 2023-06-10 RX ORDER — UREA 10 %
500 LOTION (ML) TOPICAL DAILY
Status: DISCONTINUED | OUTPATIENT
Start: 2023-06-10 | End: 2023-06-13 | Stop reason: HOSPADM

## 2023-06-10 RX ORDER — METHYLPREDNISOLONE 4 MG/1
4 TABLET ORAL AT BEDTIME
Status: DISCONTINUED | OUTPATIENT
Start: 2023-06-12 | End: 2023-06-13 | Stop reason: HOSPADM

## 2023-06-10 RX ORDER — METHYLPREDNISOLONE 4 MG/1
4 TABLET ORAL ONCE
Status: DISCONTINUED | OUTPATIENT
Start: 2023-06-10 | End: 2023-06-10

## 2023-06-10 RX ORDER — MAGNESIUM 30 MG
30 TABLET ORAL DAILY
Status: DISCONTINUED | OUTPATIENT
Start: 2023-06-10 | End: 2023-06-10

## 2023-06-10 RX ORDER — ASCORBIC ACID 500 MG
500 TABLET ORAL AT BEDTIME
Status: DISCONTINUED | OUTPATIENT
Start: 2023-06-10 | End: 2023-06-13 | Stop reason: HOSPADM

## 2023-06-10 RX ORDER — POLYETHYLENE GLYCOL 3350 17 G/17G
17 POWDER, FOR SOLUTION ORAL DAILY PRN
Status: DISCONTINUED | OUTPATIENT
Start: 2023-06-10 | End: 2023-06-13 | Stop reason: HOSPADM

## 2023-06-10 RX ORDER — METHYLPREDNISOLONE 4 MG/1
4 TABLET ORAL ONCE
Status: COMPLETED | OUTPATIENT
Start: 2023-06-10 | End: 2023-06-10

## 2023-06-10 RX ORDER — LANOLIN ALCOHOL/MO/W.PET/CERES
1000 CREAM (GRAM) TOPICAL DAILY
Status: DISCONTINUED | OUTPATIENT
Start: 2023-06-10 | End: 2023-06-13 | Stop reason: HOSPADM

## 2023-06-10 RX ORDER — POLYETHYLENE GLYCOL 3350 17 G/17G
17 POWDER, FOR SOLUTION ORAL DAILY
Status: DISCONTINUED | OUTPATIENT
Start: 2023-06-10 | End: 2023-06-10

## 2023-06-10 RX ORDER — ERYTHROMYCIN 5 MG/G
OINTMENT OPHTHALMIC 3 TIMES DAILY
Status: DISCONTINUED | OUTPATIENT
Start: 2023-06-10 | End: 2023-06-13 | Stop reason: HOSPADM

## 2023-06-10 RX ADMIN — HYDROMORPHONE HYDROCHLORIDE 0.4 MG: 0.2 INJECTION, SOLUTION INTRAMUSCULAR; INTRAVENOUS; SUBCUTANEOUS at 22:29

## 2023-06-10 RX ADMIN — METHOCARBAMOL 500 MG: 500 TABLET ORAL at 08:20

## 2023-06-10 RX ADMIN — Medication 800 MCG: at 20:05

## 2023-06-10 RX ADMIN — OXYCODONE HYDROCHLORIDE AND ACETAMINOPHEN 500 MG: 500 TABLET ORAL at 22:28

## 2023-06-10 RX ADMIN — HYDROMORPHONE HYDROCHLORIDE 0.4 MG: 0.2 INJECTION, SOLUTION INTRAMUSCULAR; INTRAVENOUS; SUBCUTANEOUS at 11:17

## 2023-06-10 RX ADMIN — SODIUM CHLORIDE: 9 INJECTION, SOLUTION INTRAVENOUS at 09:30

## 2023-06-10 RX ADMIN — OXYCODONE HYDROCHLORIDE 5 MG: 5 TABLET ORAL at 14:02

## 2023-06-10 RX ADMIN — HYDROMORPHONE HYDROCHLORIDE 0.4 MG: 0.2 INJECTION, SOLUTION INTRAMUSCULAR; INTRAVENOUS; SUBCUTANEOUS at 00:35

## 2023-06-10 RX ADMIN — ACETAMINOPHEN 975 MG: 325 TABLET, FILM COATED ORAL at 13:59

## 2023-06-10 RX ADMIN — PREDNISONE 60 MG: 20 TABLET ORAL at 14:00

## 2023-06-10 RX ADMIN — HYDROMORPHONE HYDROCHLORIDE 0.2 MG: 0.2 INJECTION, SOLUTION INTRAMUSCULAR; INTRAVENOUS; SUBCUTANEOUS at 08:51

## 2023-06-10 RX ADMIN — PREDNISONE 60 MG: 20 TABLET ORAL at 14:37

## 2023-06-10 RX ADMIN — ACETAMINOPHEN 975 MG: 325 TABLET, FILM COATED ORAL at 07:57

## 2023-06-10 RX ADMIN — SODIUM CHLORIDE: 9 INJECTION, SOLUTION INTRAVENOUS at 17:43

## 2023-06-10 RX ADMIN — FENTANYL CITRATE 50 MCG: 50 INJECTION, SOLUTION INTRAMUSCULAR; INTRAVENOUS at 04:43

## 2023-06-10 RX ADMIN — LEVETIRACETAM 750 MG: 750 TABLET, FILM COATED ORAL at 12:15

## 2023-06-10 RX ADMIN — CEPHALEXIN 250 MG: 250 CAPSULE ORAL at 16:30

## 2023-06-10 RX ADMIN — ERYTHROMYCIN 1 G: 5 OINTMENT OPHTHALMIC at 20:14

## 2023-06-10 RX ADMIN — CEPHALEXIN 250 MG: 250 CAPSULE ORAL at 14:01

## 2023-06-10 RX ADMIN — SENNOSIDES AND DOCUSATE SODIUM 2 TABLET: 50; 8.6 TABLET ORAL at 14:01

## 2023-06-10 RX ADMIN — OXYCODONE HYDROCHLORIDE 5 MG: 5 TABLET ORAL at 09:29

## 2023-06-10 RX ADMIN — METHYLPREDNISOLONE 4 MG: 4 TABLET ORAL at 20:11

## 2023-06-10 RX ADMIN — ONDANSETRON 4 MG: 2 INJECTION INTRAMUSCULAR; INTRAVENOUS at 07:49

## 2023-06-10 RX ADMIN — ACETAMINOPHEN 975 MG: 325 TABLET, FILM COATED ORAL at 20:03

## 2023-06-10 RX ADMIN — ONDANSETRON 4 MG: 2 INJECTION INTRAMUSCULAR; INTRAVENOUS at 13:59

## 2023-06-10 RX ADMIN — CALCIUM 500 MG: 500 TABLET ORAL at 17:43

## 2023-06-10 RX ADMIN — METHYLPREDNISOLONE 8 MG: 8 TABLET ORAL at 22:28

## 2023-06-10 RX ADMIN — LEVETIRACETAM 750 MG: 750 TABLET, FILM COATED ORAL at 20:14

## 2023-06-10 RX ADMIN — METHOCARBAMOL 500 MG: 500 TABLET ORAL at 16:30

## 2023-06-10 RX ADMIN — FERROUS SULFATE TAB 325 MG (65 MG ELEMENTAL FE) 325 MG: 325 (65 FE) TAB at 20:10

## 2023-06-10 RX ADMIN — LOSARTAN POTASSIUM 50 MG: 50 TABLET, FILM COATED ORAL at 10:32

## 2023-06-10 RX ADMIN — CEPHALEXIN 250 MG: 250 CAPSULE ORAL at 20:10

## 2023-06-10 RX ADMIN — METHOCARBAMOL 500 MG: 500 TABLET ORAL at 12:15

## 2023-06-10 RX ADMIN — METHOCARBAMOL 500 MG: 500 TABLET ORAL at 20:13

## 2023-06-10 RX ADMIN — OXYMETAZOLINE HYDROCHLORIDE 2 SPRAY: 0.05 SPRAY NASAL at 20:35

## 2023-06-10 RX ADMIN — SENNOSIDES AND DOCUSATE SODIUM 2 TABLET: 50; 8.6 TABLET ORAL at 20:04

## 2023-06-10 RX ADMIN — SODIUM CHLORIDE: 9 INJECTION, SOLUTION INTRAVENOUS at 04:51

## 2023-06-10 RX ADMIN — ERYTHROMYCIN 1 G: 5 OINTMENT OPHTHALMIC at 14:02

## 2023-06-10 RX ADMIN — LIDOCAINE PATCH 4% 1 PATCH: 40 PATCH TOPICAL at 20:14

## 2023-06-10 ASSESSMENT — ACTIVITIES OF DAILY LIVING (ADL)
ADLS_ACUITY_SCORE: 46
ADLS_ACUITY_SCORE: 46
ADLS_ACUITY_SCORE: 48
ADLS_ACUITY_SCORE: 35
ADLS_ACUITY_SCORE: 46

## 2023-06-10 NOTE — ED NOTES
[facility]  ED Nurse Handoff Report    ED Chief complaint: Fall and Facial Injury  . ED Diagnosis:   Final diagnoses:   Subarachnoid hemorrhage (H)   Orbital floor (blow-out) closed fracture (H)   Contusion of face, initial encounter   Fall, initial encounter       Allergies:   Allergies   Allergen Reactions     Lisinopril Cough     Morphine Hives       Code Status: Full Code    Activity level - Baseline/Home:  standby.  Activity Level - Current:   assist of 1.   Lift room needed: No.   Bariatric: No   Needed: No   Isolation: No.   Infection: Not Applicable.     Respiratory status: Nasal cannula    Vital Signs (within 30 minutes):   Vitals:    06/09/23 1922 06/09/23 1923 06/09/23 1924 06/09/23 1925   BP:       Pulse: 63 62 62 61   Resp: 12 11 12 12   Temp:       TempSrc:       SpO2: 94% 91% 93% 94%       Cardiac Rhythm:  ,      Pain level:    Patient confused: No.   Patient Falls Risk: bed/chair alarm on, nonskid shoes/slippers when out of bed, arm band in place, patient and family education and assistive device/personal items within reach.   Elimination Status: Has voided     Patient Report - Initial Complaint: Fall, facial injury.   Focused Assessment: Argelia Villarreal is a 78 year old female with a history or stage 4 metastatic breast cancer currently on chemotherapy who presents with two family member via EMS after a fall. Patient was getting her mail today when she fell in her driveway face first. Her pain is most intense in her face and right knee. Pain is 10/10. She denies pain to her arms, chest, or abdomen. Patient denies new back pain. She feels as though her teeth align. Patient notes new onset of generalized weakness since the fall. She denies numbness or tingling to her bilateral extremities. Patient lives alone. She endorses drinking a glass of wine at night and denies tobacco use. Patient is on a course of Ibrance that she takes 21 days on and 7 days off, and her  female  notes that she is currently on the 7 days off. She takes a baby aspirin for anticoagulation.    Abnormal Results:   Labs Ordered and Resulted from Time of ED Arrival to Time of ED Departure   BASIC METABOLIC PANEL - Abnormal       Result Value    Sodium 135 (*)     Potassium 3.9      Chloride 98      Carbon Dioxide (CO2) 24      Anion Gap 13      Urea Nitrogen 24.6 (*)     Creatinine 1.34 (*)     Calcium 9.6      Glucose 118 (*)     GFR Estimate 41 (*)    CBC WITH PLATELETS AND DIFFERENTIAL - Abnormal    WBC Count 3.3 (*)     RBC Count 2.47 (*)     Hemoglobin 10.4 (*)     Hematocrit 29.7 (*)      (*)     MCH 42.1 (*)     MCHC 35.0      RDW 12.7      Platelet Count 104 (*)     % Neutrophils 55      % Lymphocytes 19      % Monocytes 23      % Eosinophils 1      % Basophils 2      % Immature Granulocytes 0      NRBCs per 100 WBC 0      Absolute Neutrophils 1.8      Absolute Lymphocytes 0.6 (*)     Absolute Monocytes 0.8      Absolute Eosinophils 0.0      Absolute Basophils 0.1      Absolute Immature Granulocytes 0.0      Absolute NRBCs 0.0     INR - Normal    INR 1.03     RBC AND PLATELET MORPHOLOGY    Platelet Assessment        Value: Automated Count Confirmed. Platelet morphology is normal.    RBC Morphology Confirmed RBC Indices     TYPE AND SCREEN, ADULT    ABO/RH(D) A POS      Antibody Screen Negative      SPECIMEN EXPIRATION DATE [id number]     ABO/RH TYPE AND SCREEN        XR Chest 1 View   Final Result   IMPRESSION: Diffuse sclerosis of the visualized bony skeleton. Advanced degenerative changes most marked in the right shoulder. Mild thoracic spinal curvature. Surgical clips projected over the lateral aspect of the mid right chest wall. No obvious acute    finding such as pneumothorax or displaced bony fracture.      XR Pelvis 1/2 Views   Final Result   IMPRESSION: Diffuse sclerosis and heterogeneity of the bone is consistent with the patient's known history of metastatic breast  cancer. There is a left total hip replacement. There is no evidence of an acute displaced fracture or dislocation on the    single AP view. Atherosclerotic vascular calcifications.      XR Knee Right 1/2 Views   Final Result   IMPRESSION: Diffuse sclerosis and heterogeneity of the bones consistent with the patient's known history of metastatic breast cancer. Mild tricompartmental degenerative changes of the right knee. No evidence of a fracture or joint effusion.    Atherosclerotic vascular calcifications.      Lumbar spine CT w/o contrast   Final Result   IMPRESSION:   1.  No acute lumbar spine fracture.   2.  Widespread blastic metastases.   3.  Degenerative changes with multilevel severe canal stenosis, as detailed above.      Cervical spine CT w/o contrast   Final Result   IMPRESSION:   HEAD CT:   1.  Scattered small volume subarachnoid hemorrhage.    2.  No mass effect, midline shift or calvarial fracture.      FACIAL BONE CT:   1.  Left orbital floor blowout fracture with distorted appearance of the left inferior rectus muscle. This could be related to intramuscular injury or herniation. Clinical correlation for extraocular muscle entrapment is recommended.   2.  Small volume of extraconal hemorrhage along the floor of the left orbit. This does not extend to the orbital apex.   3.  Posttraumatic hemorrhage in the left maxillary sinus and left periorbital soft tissue hematoma.      CERVICAL SPINE CT:   1.  No acute cervical spine fracture.   2.  Widespread osseous metastases.      Critical Result: Acute intracranial hemorrhage and possible left inferior rectus muscle entrapment      Finding was identified on 6/9/2023 6:08 PM CDT.      Dr. Drew Henry was contacted by me on 6/9/2023 6:14 PM CDT.      CT Facial Bones without Contrast   Final Result   IMPRESSION:   HEAD CT:   1.  Scattered small volume subarachnoid hemorrhage.    2.  No mass effect, midline shift or calvarial fracture.      FACIAL BONE CT:    1.  Left orbital floor blowout fracture with distorted appearance of the left inferior rectus muscle. This could be related to intramuscular injury or herniation. Clinical correlation for extraocular muscle entrapment is recommended.   2.  Small volume of extraconal hemorrhage along the floor of the left orbit. This does not extend to the orbital apex.   3.  Posttraumatic hemorrhage in the left maxillary sinus and left periorbital soft tissue hematoma.      CERVICAL SPINE CT:   1.  No acute cervical spine fracture.   2.  Widespread osseous metastases.      Critical Result: Acute intracranial hemorrhage and possible left inferior rectus muscle entrapment      Finding was identified on 6/9/2023 6:08 PM CDT.      Dr. Drew Henry was contacted by me on 6/9/2023 6:14 PM CDT.      Head CT w/o contrast   Final Result   IMPRESSION:   HEAD CT:   1.  Scattered small volume subarachnoid hemorrhage.    2.  No mass effect, midline shift or calvarial fracture.      FACIAL BONE CT:   1.  Left orbital floor blowout fracture with distorted appearance of the left inferior rectus muscle. This could be related to intramuscular injury or herniation. Clinical correlation for extraocular muscle entrapment is recommended.   2.  Small volume of extraconal hemorrhage along the floor of the left orbit. This does not extend to the orbital apex.   3.  Posttraumatic hemorrhage in the left maxillary sinus and left periorbital soft tissue hematoma.      CERVICAL SPINE CT:   1.  No acute cervical spine fracture.   2.  Widespread osseous metastases.      Critical Result: Acute intracranial hemorrhage and possible left inferior rectus muscle entrapment      Finding was identified on 6/9/2023 6:08 PM CDT.      Dr. Drew Henry was contacted by me on 6/9/2023 6:14 PM CDT.          Treatments provided: See MAR  Family Comments: None   OBS brochure/video discussed/provided to patient:  N/A  ED Medications:   Medications   ondansetron  (ZOFRAN) injection 4 mg (has no administration in time range)   HYDROmorphone (PF) (DILAUDID) injection 0.3-0.5 mg (0.5 mg Intravenous $Given 6/9/23 1837)   levETIRAcetam (KEPPRA) tablet 500 mg (has no administration in time range)   ondansetron (ZOFRAN) injection 4 mg (4 mg Intravenous Not Given 6/9/23 1833)       Drips infusing:  No  For the majority of the shift this patient was Green.   Interventions performed were none.    Sepsis treatment initiated: No    Cares/treatment/interventions/medications to be completed following ED care: none    ED Nurse Name: Piper Hwang RN  8:13 PM

## 2023-06-10 NOTE — H&P
[facility]    History and Physical: Trauma Service       Date of Admission:  6/10/2023    Time of Admission/Consult Request (page/call): [phone]    Time of my evaluation: 0215  Consulting services:  Neurosurgery - Emergent consult (within 30 mins): Called by ED  Ophthalmology - Called by ED    Assessment   Trauma mechanism: ground level fall   Time/date of injury: 1430 on 06/09  Known Injuries:  1. SAH (scattered, small volume, most pronounced along bilateral sylvian fissures)  2. Left periorbital hematoma  3. Left orbital floor blowout fracture with small volume hemorrhage along orbital floor  4. Left maxillary sinus hemorrhage     Other diagnoses:   1. Metastatic breast cancer  2. CKD III    Procedure:  1. None      Neuro/Pain/Psych:  # SAH  - Hold ASA  # Acute on chronic pain   - Scheduled: APAP  - Prn: robaxin, oxycodone      Pulmonary:  No acute issues  - Supplemental oxygen to keep saturation above 92 %.  - Incentive spirometer while awake     Cardiovascular:    # Hypertension   - Monitor hemodynamic status.   -PTA meds: verapamil, TMT-hydrochlorothiazide, losartan, atenlol. Hold for now. Likely resume in AM    GI/Nutrition:    No acute issues  - NPO until CT scan and ophthalmology evaluation complete    Renal/ Fluids/Electrolytes:  CKD III  - LR for IV fluid hydration.   - electrolyte replacement protocol in place.     Endocrine:  Metastatic breast cancer, on palbociclib (last dose last week)  - PTA medications: chemotherapy and endocrine therapy records with [facility]  - Goal to keep BG < 180 for optimal wound healing     Infectious disease:   No acute issues  - No indications for antibiotics.     Hematology:    # Pancytopenia related to treatment of metastatic breast cancer (Plt 104, WBC 3.3, hematocrit 29.7, RBC 2.47)   - Hgb 10.4. Monitor and trend.   - Threshold for transfusion if hgb <7.0 or signs/symptoms of hypoperfusion.     - Target Plt >100    # DVT  Prophylaxis:   Mechanical    Musculoskeletal:  # Blastic lesions (metastatic)  # Weakness and deconditioning of chronic illness   - Physical and occupational therapy consults.    Skin:  # Ecchymosis   - dilgent cares to prevent skin breakdown and wound formation.      Code status: full, confirmed with daughter, Janiya.     General Cares:  GI Prophylaxis: None  DVT Prophylaxis: Mechanical  Date of last stool/Bowel Regimen: PTA  Pulmonary toilet: IS    ETOH: This patient was asked if in the last 3-6 months there has been a time when she had 4 or more drinks in a single day/outing.. Patient answer to the screening question was in the negative. No intervention needed.  Primary Care Physician     Lita Anna      Plan   1. Admit to trauma surgery service, general care  2. Follow up neurosurgery recommendations  3. Follow up repeat head CT  4. Follow up ophthalmology recommendations  5. Complete imaging of spine (XR or CT T spine)   6. Hold ASA      Julita Ulloa MD PGY3/5  Primary team: Trauma Services   Job code pager [phone] (24 hours a day)  Use [employer] to text page (not text page compatible with [email or web address]).    Dial * * * [phone] then  [phone], wait for prompt and then enter call back number.   Do NOT call numbers listed to right in Treatment Team section.       Chief Complaint   Fall    History is obtained from the patient    History of Present Illness   Argelia Villarreal is a 78 year old female with metastatic breast cancer (skeletal and abdominal metastases) who presents as a transfer from [facility] after a fall around 1430 on 06/09. She was walking to get the mail when she fell and hit her head. Did not loose consciousness. Was transported via EMS to [facility]. Workup there demonstrated the following:     - scattered multifocal SAH  - left orbital floor blowout fracture  - left periorbital ecchymosis  - left maxillary sinus hemorrhage    She was planned for admission to [facility], however there was concern that her  extraocular movements in the left eye were compromised. Therefore, she was transferred to [facility] for further evaluation and ophthalmology management.     She complains of a headache and nausea.     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 Taking?   Ascorbic Acid (VITAMIN C) 500 MG CAPS   Yes No   Sig: Take 500 mg by mouth At Bedtime   BLACK ELDERBERRY PO   Yes No   Sig: Take 100 mg by mouth daily   Biotin 64299 MCG TABS   Yes No   Sig: Take 1 tablet by mouth 2 times daily   Cranberry-Cholecalciferol 4200-500 MG-UNIT CAPS   Yes No   Sig: Take 1 capsule by mouth daily   Folic Acid (FOLATE PO)   Yes No   Sig: Take 800 mcg by mouth 2 times daily   Garlic 1000 MG CAPS   Yes No   Sig: Take 1 capsule by mouth daily   Multiple Vitamins-Minerals (ICAPS AREDS 2 PO)   Yes No   Sig: Take 1 capsule by mouth 2 times daily   Probiotic Product (PROBIOTIC-10 PO)   Yes No   Sig: Take 1 tablet by mouth daily   aspirin 81 MG EC tablet   Yes No   Sig: Take 81 mg by mouth daily   atenolol (TENORMIN) 25 MG tablet   Yes No   Sig: Take 25 mg by mouth daily   cholecalciferol (VITAMIN D3) 25 mcg (1000 units) capsule   Yes No   Sig: Take 1 capsule by mouth daily   co-enzyme Q-10 100 MG CAPS capsule   Yes No   Sig: Take 100 mg by mouth daily   cyanocobalamin (VITAMIN B-12) 1000 MCG tablet   Yes No   Sig: Take 1,000 mcg by mouth daily   doxycycline monohydrate (MONODOX) 100 MG capsule   Yes No   Sig: Take 100  mg by mouth daily as needed (when on chemo)   ferrous sulfate (FEROSUL) 325 (65 Fe) MG tablet   Yes No   Sig: Take 325 mg by mouth 2 times daily   fish oil-omega-3 fatty acids 1000 MG capsule   Yes No   Sig: Take 1 g by mouth every morning   losartan (COZAAR) 50 MG tablet   Yes No   Sig: Take 50 mg by mouth daily   magnesium 250 MG tablet   Yes No   Sig: Take 1 tablet by mouth daily   palbociclib (IBRANCE) 100 MG capsule   Yes No   Sig: Take 100 mg by mouth daily with food 21 days on  7 days off   triamterene-HCTZ (MAXZIDE-25) 37.5-25 MG tablet   Yes No   Sig: Take 1 tablet by mouth daily   verapamil ER (CALAN-SR) 180 MG CR tablet   Yes No   Sig: Take 180 mg by mouth At Bedtime      Facility-Administered Medications: None     Allergies   Allergies   Allergen Reactions     Lisinopril Cough     Morphine Hives       Social History   Social History     Socioeconomic History     Marital status:      Spouse name: Not on file     Number of children: Not on file     Years of education: Not on file     Highest education level: Not on file   Occupational History     Not on file   Tobacco Use     Smoking status: Not on file     Smokeless tobacco: Not on file   Vaping Use     Vaping status: Not on file   Substance and Sexual Activity     Alcohol use: Not on file     Drug use: Not on file     Sexual activity: Not on file   Other Topics Concern     Not on file   Social History Narrative     Not on file     Social Determinants of Health     Financial Resource Strain: Not on file   Food Insecurity: Not on file   Transportation Needs: Not on file   Physical Activity: Not on file   Stress: Not on file   Social Connections: Not on file   Intimate Partner Violence: Not on file   Housing Stability: Not on file       Family History    Non-contributory    Review of Systems   CONSTITUTIONAL: No fever, chills, sweats, fatigue   EYES: no visual blurring, no double vision or visual loss  ENT: no decrease in hearing, no tinnitus, no  vertigo, no hoarseness  RESPIRATORY: no shortness of breath, no cough, no sputum   CARDIOVASCULAR: no palpitations, no chest  pain, no exertional chest pain or pressure  GASTROINTESTINAL: no nausea or vomiting, or abd pain  GENITOURINARY: no dysuria, no frequency or hesitancy, no hematuria  MUSCULOSKELETAL: generalized weakness, no redness, no swelling, no joint pain,   SKIN: no rashes, ecchymoses present, abrasions or lacerations  NEUROLOGIC: no numbness or tingling of hands, no numbness or tingling  of feet, no syncope, no tremors or weakness  PSYCHIATRIC: no sleep disturbances, no anxiety or depression    Physical Exam   Temp: 98.3  F (36.8  C) Temp src: Oral BP: 133/73 Pulse: 64   Resp: 10 SpO2: 91 % O2 Device: None (Room air)    Vital Signs with Ranges  Temp:  [98  F (36.7  C)-98.3  F (36.8  C)] 98.3  F (36.8  C)  Pulse:  [60-81] 64  Resp:  [10-18] 10  BP: (125-185)/(42-73) 133/73  SpO2:  [91 %-100 %] 91 % 0 lbs 0 oz    Primary Survey:   Airway: patient talking  Breathing: symmetric respiratory effort bilaterally  Circulation: central pulses present and peripheral pulses present  Disability: Pupils - left 4 mm and brisk, right 4 mm and brisk     Stephani Coma Scale - Total 15/15  Eye Response (E): 4  4= spontaneous,  3= to verbal/voice, 2=  to pain, 1= No response   Verbal Response (V): 5   5= Orientated, converses,  4= Confused, converses, 3= Inappropriate words,  2= Incomprehensible sounds,  1=No response   Motor Response (M): 6   6= Obeys commands, 5= Localizes to pain, 4= Withdrawal to pain, 3=Fexion to pain, 2= Extension to pain, 1= No response    Secondary Survey:  General: alert, oriented to person, place, time  Head: atraumatic, normocephalic,  Eyes: Left eye swollen shut, but able to open with some assistance. Bilateral PERRLA, EOMI, No apparent difficulty with inferior gaze on my exam. Corneas and conjunctivae clear on the right. Left with slight hemorrhage in conjunctiva.   Nose: nares patent, no  drainage, nasal septum non-tender  Mouth/Throat: no exudates or erythema,  no dental tenderness or malocclusions, no tongue lacerations  Neck:  no cervical collar present. Trachea midline. No midline posterior tenderness, full AROM without pain or tenderness   Chest/Pulmonary: normal respiratory rate and rhythm,  bilateral clear breath sounds, no wheezes, rales or rhonchi, no chest wall tenderness or deformities,   Cardiovascular: S1, S2,  normal and regular rate and rhythm, no murmurs  Abdomen: soft, non-tender, no guarding, no rebound tenderness and no tenderness to palpation  : pelvis stable to lateral compression  Back/Spine: no deformity, no tenderness, no sacral tenderness,  no step-offs and no abrasions or contusions  Musculoskel/Extremities: normal extremities, full AROM of major joints without tenderness, edema, erythema, ecchymosis, or abrasions.   Hand: no gross deformities of hands or fingers. Full AROM of hand and fingers in flexion and extension.  strength equal and symmetric.   Skin: no rashes, laceration, ecchymosis, skin warm and dry.   Neuro: PERRLA as above, alert, oriented x 3. CN II-XII grossly intact. No focal deficits. Strength 5/5 x 4 extremities.  Sensation intact.  Psychiatric: affect/mood normal, cooperative, normal judgement/insight and memory intact  # Pain Assessment:      6/10/2023     1:27 AM   Current Pain Score   Patient currently in pain? denies   - Argelia is experiencing pain due to acute injuries . Pain management was discussed with Argelia and her family and the plan was created in a collaborative fashion.  Argelia's response to the current recommendations: engaged  - Please see the plan for pain management as documented above      Data   Results for orders placed or performed during the hospital encounter of 06/10/23 (from the past 24 hour(s))   [facility] Draw    Narrative    The following orders were created for panel order [facility] Draw.  Procedure                                Abnormality         Status                     ---------                               -----------         ------                     Extra Blue Top Tube[[id number]]                              Final result               Extra Red Top Tube[[id number]]                               Final result               Extra Green Top (Lithium...[[id number]]                      Final result               Extra Purple Top Tube[[id number]]                            Final result                 Please view results for these tests on the individual orders.   Extra Blue Top Tube   Result Value Ref Range    Hold Specimen JIC    Extra Red Top Tube   Result Value Ref Range    Hold Specimen JIC    Extra Green Top (Lithium Heparin) Tube   Result Value Ref Range    Hold Specimen JIC    Extra Purple Top Tube   Result Value Ref Range    Hold Specimen JIC        Studies:  CT Head w/o Contrast    (Results Pending)   CT Thoracic Spine w/o Contrast    (Results Pending)   Initial CT head, face, and C Spine:   IMPRESSION:  HEAD CT:  1.  Scattered small volume subarachnoid hemorrhage.   2.  No mass effect, midline shift or calvarial fracture.     FACIAL BONE CT:  1.  Left orbital floor blowout fracture with distorted appearance of the left inferior rectus muscle. This could be related to intramuscular injury or herniation. Clinical correlation for extraocular muscle entrapment is recommended.  2.  Small volume of extraconal hemorrhage along the floor of the left orbit. This does not extend to the orbital apex.  3.  Posttraumatic hemorrhage in the left maxillary sinus and left periorbital soft tissue hematoma.     CERVICAL SPINE CT:  1.  No acute cervical spine fracture.  2.  Widespread osseous metastases.    CT L spine:   IMPRESSION:  1.  No acute lumbar spine fracture.  2.  Widespread blastic metastases.  3.  Degenerative changes with multilevel severe canal stenosis, as detailed above.    XR of right knee, pelvis, and chest without any  acute injuries, but with diffuse sclerosis consistent with metastatic disease.     Please see chart for further details of imaging and labs obtained at [facility].

## 2023-06-10 NOTE — PROGRESS NOTES
Neurosurgery    Contacted regarding this patient s/p fall. Able to stay at [facility]. Will repeat head CT in six hours and initiate keppra for seven days. Per reports she is neurologically intact.     Reviewed with Dr. Hassan.    JUAN RAMON Nair, HECTOR  [facility] Neurosurgery  [facility]     Tel: [phone]  Pager: [phone]

## 2023-06-10 NOTE — LETTER
Transition Communication Hand-off for Care Transitions to Next Level of Care Provider    Name: Argelia Villarreal  : 1945  MRN #: [id number]  Primary Care Provider: Lita Anna     Primary Clinic: [address]  [facility] [address]     Reason for Hospitalization:  Subarachnoid hemorrhage (H) [I60.9]  Orbital floor (blow-out) closed fracture (H) [S02.30XA]  SAH (subarachnoid hemorrhage) (H) [I60.9]  Fall, initial encounter [W19.XXXA]  Admit Date/Time: 6/10/2023  1:22 AM  Discharge Date: 23  Reason for Communication Hand-off Referral: Other continuity of care following hospitalization.     Discharge Plan:  home with family, home with home nursing, home PT, home OT.       Concern for non-adherence with plan of care:  No.     Key Recommendations:  Please follow patient post-hospital discharge.  Thanks!    Susu Floyd RNCC    AVS/Discharge Summary is the source of truth; this is a helpful guide for improved communication of patient story             20-May-2021

## 2023-06-10 NOTE — PROGRESS NOTES
Brief Neurosurgery Note    Please refer to consult note for more details.  Patient with traumatic subarachnoid hemorrhage, neurologically intact, now with stable CT.     Patient can follow up PRN in Neurosurgery clinic  Follow up in Traumatic Brain Injury clinic   Keppra 7d  Hold aspirin indefinitely given lack of indication   Neurosurgery will follow peripherally     Jillian Mills MD  Neurosurgery PGY3    Please contact neurosurgery resident on call with questions.    Dial * * *[phone], enter [phone] when prompted.

## 2023-06-10 NOTE — ED TRIAGE NOTES
tx for fall   SAH, L orbital blowout fx  Oriented x 4  VSS   No blood thinners     Triage Assessment     Row Name 06/10/23 0127       Triage Assessment (Adult)    Airway WDL WDL       Respiratory WDL    Respiratory WDL WDL       Skin Circulation/Temperature WDL    Skin Circulation/Temperature WDL WDL       Cardiac WDL    Cardiac WDL WDL       Peripheral/Neurovascular WDL    Peripheral Neurovascular WDL WDL       Cognitive/Neuro/Behavioral WDL    Cognitive/Neuro/Behavioral WDL WDL

## 2023-06-10 NOTE — H&P
[facility]    History and Physical - Hospitalist Service       Date of Admission:  6/9/2023    Assessment & Plan Argelia Villarreal is a 78 year old female with PMhx of metastatic breast cancer undergoing treatment, hypertension, CKD 3, who was admitted on 6/9/2023 after a mechanical fall with resultant SAH and orbital fracture.     Mechanical Fall   Traumatic Subarachnoid hemorrhage   Left orbital floor blowout fracture with possible entrapment of the left inferior rectus muscle  Patient sustained a mechanical fall forward, lost footing getting mail. No LOC. Not systemically anticoagulated but is on a daily baby aspirin which she took this morning. Neurologic exam intact. HEENT with significant facial trauma and preorbital edema limiting visual exam with no changes in vision once able to open eye with assistance.  Trauma imaging revealed small volume subarachnoid hemorrhage without mass effect midline shift or skull fracture.  Facial bone CT identified left orbital floor blowout fracture with possible injury to left inferior rectus muscle, small volume hemorrhage along the floor of the left orbit, posttraumatic hemorrhage in the left maxillary sinus and left periorbital soft tissue hematoma.  No C-spine injury, osseous mets noted on skeletal imaging.  No fractures of ribs, pelvis, right knee where she was having pain.  Both neurosurgery and ophthalmology were contacted from the emergency department. No surgical intervention.   -spoke with Ophthalmology (Dr Claros) regarding limited eye EOM exam due to edema. At this time recommended checking eye pressures in ED, small volume hemorrhage floor of left orbit with intact orbit   - needs ongoing monitoring for worsening eye pain, changes in vision in which case needs to be transferred to [facility] for Ophthalmology evaluation. Otherwise would recommend close follow up in clinic.   -Neurosurgery recommended repeat imaging in 6 hours and Keppra for 7  days, recommended continued observing at [facility], no transfer recommended by either specialists.   -Subarachnoid hemorrhage management per neurosurgery  - systolic blood pressure under 160 use nicardipine or labetalol as needed  -Monitor mental status neurochecks every 4. stat imaging if any change in mentation  -Goal sodium level above 135  -ongoing visual screening, monitor for worsening eye pain particularly with eye movements or abnormalities in vision when eye is open initiate transfer to [facility] for ophthalmology  -ENT consult   -Start Empiric antibiotics with Augmentin for orbital floor blow out fracture   -topical wound cares, analgesia PRN  -soft diet to start, Gentle IV fluids overnight as intake has been low  -If repeat imaging is stable, advance activity with restrictions as per neurosurgery. Consider PT assessment    Metastatic breast cancer  Pancytopenias, treatment related   Under the care of [facility] in [facility]. Currently undergoing therapy with Palbociclib (currently in '7 day off period' with plans to restart 6/12) and I believe Fulvestrant? Unable to review [facility] records.  Had bilateral mastectomies in 2014.  Per patient known metastatic breast cancer per patient  skeletal and abdominal, requires int paracentesis.   CBC with leukopenia, hgb of 10.4, platelets 104k.   Imaging notes diffuse sclerosis and degenerative changes with known history of blastic metastasis.  - type and cross completed in ED  - monitor cbc with am labs  - given SAH if any concern for worsening bleeding on imaging repeat cbc and transfuse platelets, hgb PRN with parameters per NSG  - continue folate and vitamin replacement    CKD III  Creatinine is 1.34, near baseline 1.36 1 year ago  - bmp with am labs       Hypertension  Prior to admission taking verapamil, triamterene hydrochlorothiazide, losartan, atenolol.  -Stagger resumption of blood pressure medications with hold parameters.  Hold triamterene hydrochlorothiazide  "tonight given low intake throughout the day       Diet:   advance as tolerated    DVT Prophylaxis: Pneumatic Compression Devices  Regalado Catheter: Not present    Cardiac Monitoring: None    Code Status:   Full code as discussed on admission    Clinically Significant Risk Factors Present on Admission                # Thrombocytopenia: Lowest platelets = 104 in last 2 days, will monitor for bleeding   # Hypertension: Home medication list includes antihypertensive(s)               Disposition Plan      Expected Discharge Date: 06/11/2023         to home with increased assistance and likely HHC.      The patient's care was discussed with the Attending Physician, Dr. Razo, Patient's Family and ED Team.      Chloe Sapp PA-C    ______________________________________________________________________    Chief Complaint   Fall    History is obtained from the patient, with assistance from patient's daughter at bedside.    History of Present Illness   Argelia Villarreal is a 78 year old female who was brought to the emergency department by EMS after a fall.  Ms. Villarreal was out in her driveway getting her mail around 14:30 in the afternoon today when she sustained a fall forward \"face first\".  She feels that she tripped and lost her footing.  She reports that she did not catch herself with the fall.  She denied loss of consciousness.  Normally does not use any assistive devices for ambulation.  Her neighbor found her in the driveway she had difficulty getting up to a standing position and had severe pain in her face and right knee.  Neighbor called 911 and patient's daughter.  She was transported to the emergency department for evaluation.  She is taking baby aspirin daily which she took this morning.  Has not taken anything for pain before being transferred to the ED.  She is currently undergoing chemotherapy with [facility].  Denies any history of stroke blood clots or bleeding problems.  She states that she was in " "her usual state of health prior to falling.    On my assessment she is ongoing pain mostly diffusely in her face and her right knee.  She also describes a muscle cramp in her right calf.  No new back pain.  She denies any tongue pain or laceration in her mouth.  No numbness or tingling, weakness in her legs.  She has not been up to stand yet and has been in bed rest.  Pain medication is helping facial pain.  She denies any changes in her vision or double vision.  She had cataract surgery and does not wear glasses normally.  No nausea or vomiting.  In the ED no seizure activity reported.    In the ED given Zofran, Dilaudid.     Admission was requested from hospitalist service for \"sah, facial fracture\".    Past Medical History    As above.     Past Surgical History   As above.    Prior to Admission Medications   Prior to Admission Medications   Prescriptions Last Dose Informant Patient Reported? Taking?   atenolol (TENORMIN) 25 MG tablet   Yes Yes   Sig: Take 25 mg by mouth daily   losartan (COZAAR) 50 MG tablet   Yes Yes   Sig: Take 50 mg by mouth daily   palbociclib (IBRANCE) 100 MG capsule   Yes Yes   Sig: Take 100 mg by mouth daily with food 21 days on  7 days off   triamterene-HCTZ (MAXZIDE-25) 37.5-25 MG tablet   Yes Yes   Sig: Take 1 tablet by mouth daily   verapamil ER (CALAN-SR) 180 MG CR tablet   Yes Yes   Sig: Take 180 mg by mouth At Bedtime      Facility-Administered Medications: None        Review of Systems    The 10 point Review of Systems is negative other than noted in the HPI or here.     Social History   I have reviewed this patient's social history and updated it with pertinent information if needed. , Lives independently.  Denies tobacco use.  Reports intermittent alcohol use, and did report drinking a glass of wine last night.  Denies alcohol use today.        Physical Exam   Vital Signs: Temp: 98  F (36.7  C) Temp src: Temporal BP: (!) 152/60 Pulse: 61   Resp: 12 SpO2: 94 % O2 Device: " Nasal cannula Oxygen Delivery: 2 LPM  Weight: 0 lbs 0 oz    Constitutional: Awake, alert,  no apparent distress.  Eyes: Significant facial trauma with left periorbital and maxillary edema, superficial abrasions on the nasal bridge and forehead.  Surrounding ecchymosis developing.  Eyelid is difficult to open spontaneously due to significant swelling.  She has clear drainage and dried blood at the lash line.  Horizontal extraocular eye movements are intact.  Limited extraocular eye movements inferiorly and exam limited due to degree of swelling.  No nystagmus.  Pin pulls are equal and responsive.  No scalp laceration.  ENT: Significant facial trauma and ecchymosis to upper lips edema extending from maxillary region.  Ecchymosis and oral mucosa no lacerations of oral mucosa or tongue. No broken dentition and multiple fillings in place.   Respiratory: Clear to auscultation bilaterally, no crackles or wheezing.  Cardiovascular: Regular rate and rhythm, normal S1 and S2, and no murmur noted.  GI: Soft, non-distended, non-tender, bowel sounds present. No rebound tenderness or guarding.  Lymph/Hematologic: No anterior cervical or supraclavicular adenopathy.  Skin: No rashes, no cyanosis, no edema.  Musculoskeletal: No deformities noted.  No erythema or tenderness. Moving all extremities.  Neurologic: No focal deficits noted. Speech is clear. Coordination and strength grossly normal.   Psychiatric: Appropriate affect.       Medical Decision Making       120 MINUTES SPENT BY ME on the date of service doing chart review, history, exam, documentation & further activities per the note.  Including chart review, serial review of patient, discussion with ophthalmologist and ED.     Data   ------------------------- PAST 24 HR DATA REVIEWED -----------------------------------------------

## 2023-06-10 NOTE — ED PROVIDER NOTES
ED Provider Note  [facility]      History     Chief Complaint   Patient presents with     Fall     HPI  Argelia Villarreal is a 78 year old female who is presenting from outside hospital after a mechanical fall with a subarachnoid hemorrhage and orbital fracture.  She is sent here with concern for her orbital fracture.  Currently she has no visual changes.  No double vision.  Has some headache but it is mild at this point does not want pain medications.  She is on aspirin otherwise no blood thinners.    Past Medical History  History reviewed. No pertinent past medical history.  No past surgical history on file.  Ascorbic Acid (VITAMIN C) 500 MG CAPS  aspirin 81 MG EC tablet  atenolol (TENORMIN) 25 MG tablet  Biotin 98048 MCG TABS  BLACK ELDERBERRY PO  cholecalciferol (VITAMIN D3) 25 mcg (1000 units) capsule  co-enzyme Q-10 100 MG CAPS capsule  Cranberry-Cholecalciferol 4200-500 MG-UNIT CAPS  cyanocobalamin (VITAMIN B-12) 1000 MCG tablet  doxycycline monohydrate (MONODOX) 100 MG capsule  ferrous sulfate (FEROSUL) 325 (65 Fe) MG tablet  fish oil-omega-3 fatty acids 1000 MG capsule  Folic Acid (FOLATE PO)  Garlic 1000 MG CAPS  losartan (COZAAR) 50 MG tablet  magnesium 250 MG tablet  Multiple Vitamins-Minerals (ICAPS AREDS 2 PO)  palbociclib (IBRANCE) 100 MG capsule  Probiotic Product (PROBIOTIC-10 PO)  triamterene-HCTZ (MAXZIDE-25) 37.5-25 MG tablet  verapamil ER (CALAN-SR) 180 MG CR tablet      Allergies   Allergen Reactions     Lisinopril Cough     Morphine Hives     Family History  No family history on file.  Social History          A medically appropriate review of systems was performed with pertinent positives and negatives noted in the HPI, and all other systems negative.    Physical Exam   BP: 133/73  Pulse: 65  Temp: 98.3  F (36.8  C)  Resp: 16  SpO2: 92 %  Physical Exam  Physical Exam   Constitutional: oriented to person, place, and time. appears well-developed and  well-nourished.   HENT:   Head: Parable ecchymosis over the left eye with swollen lid.  Extraocular muscles intact.  Pupils 3 mm and reactive to light bilaterally.  Tender palpation over the left orbit.  No proptosis.  Neck: Normal range of motion. No tenderness over the C-spine.  Pulmonary/Chest: Effort normal. No respiratory distress. Clear lungs bilaterally.  Cardiac: No murmurs, rubs, gallops. RRR.  Abdominal: Abdomen soft, nontender, nondistended. No rebound tenderness.  MSK: Long bones without deformity or evidence of trauma.  No tenderness over the upper or lower extremities.  Neurological: alert and oriented to person, place, and time.   Skin: Skin is warm and dry.   Psychiatric:  normal mood and affect.  behavior is normal. Thought content normal.       ED Course, Procedures, & Data      Procedures              Results for orders placed or performed during the hospital encounter of 06/10/23   CT Head w/o Contrast     Status: None (Preliminary result)    Narrative    EXAM: CT HEAD W/O CONTRAST  LOCATION: [facility]  DATE/TIME: 6/10/2023 3:17 AM CDT    INDICATION: Subarachnoid hemorrhage. Repeat exam.  COMPARISON: 6/9/2023.  TECHNIQUE: Routine CT Head without IV contrast. Multiplanar reformats. Dose reduction techniques were used.    FINDINGS:  INTRACRANIAL CONTENTS: No abnormal extra-axial fluid collection. Again seen is scattered bilateral subarachnoid hemorrhage noted throughout the cerebral convexities of the supratentorial brain, similar in distribution, but slightly diminished within the   left sylvian fissure. Question small focus of intraparenchymal hemorrhage versus increased subarachnoid hemorrhage within the left posterior sylvian fissure at the frontotemporal junction measuring 0.6 cm (image 18 series 4). No CT evidence of acute   infarct. Mild presumed chronic small vessel ischemic changes. Mild generalized volume loss. No hydrocephalus. No midline  shift identified.    VISUALIZED ORBITS/SINUSES/MASTOIDS: Prior bilateral cataract surgery. Visualized portions of the orbits are otherwise unremarkable. Near-complete opacification of the left maxillary sinus. Ethmoid air cell mucosal thickening. No middle ear or mastoid   effusion.    BONES/SOFT TISSUES: No calvarial fracture. Bilateral temporomandibular joint osteoarthritis. Left lateral scalp soft tissue swelling/contusion. Left facial soft tissue swelling/contusion.      Impression    IMPRESSION:  1.  Evolving multicompartmental subarachnoid hemorrhage, as above.  2.  Question small focus of intraparenchymal hemorrhage at the frontotemporal junction measuring 0.5 cm versus increased subarachnoid hemorrhage. Continued attention on follow-up imaging is recommended.   CT Thoracic Spine w/o Contrast     Status: None (Preliminary result)    Narrative    EXAM: CT THORACIC SPINE W/O CONTRAST  LOCATION: [facility]  DATE/TIME: 6/10/2023 3:16 AM CDT    INDICATION: Fall. Traumatic injury.  COMPARISON: None.  TECHNIQUE: Routine CT Thoracic Spine without IV contrast. Multiplanar reformats. Dose reduction techniques were used.     FINDINGS:  VERTEBRA: Normal vertebral body heights. Diffuse sclerotic osseous metastatic disease. No definite acute fracture or posttraumatic subluxation.    CANAL/FORAMINA: Mild multilevel degenerative changes. No high-grade central spinal canal stenosis. Disc osteophyte complex at T10-T11 causes mild canal stenosis. Disc osteophyte complex at T12-L1 causes mild canal stenosis. No high-grade neural foraminal   stenosis.    PARASPINAL: No acute extraspinal abnormality. Scattered vascular calcifications. Bilateral dependent atelectasis identified involving the lower lobes. Incompletely imaged cystic lesion involving the right kidney measuring up to 7.1 cm.      Impression    IMPRESSION:  1.  No fracture or posttraumatic subluxation.  2.  No high-grade  spinal canal or neural foraminal stenosis.  3.  Diffuse sclerotic osseous metastatic disease.  4.  Incompletely imaged cystic lesion involving the right kidney measuring up to 7.1 cm. Recommend follow-up nonemergent renal ultrasound for further evaluation.     [place] Draw     Status: None    Narrative    The following orders were created for panel order [place] Draw.  Procedure                               Abnormality         Status                     ---------                               -----------         ------                     Extra Blue Top Tube[[id number]]                              Final result               Extra Red Top Tube[[id number]]                               Final result               Extra Green Top (Lithium...[[id number]]                      Final result               Extra Purple Top Tube[[id number]]                            Final result                 Please view results for these tests on the individual orders.   Extra Blue Top Tube     Status: None   Result Value Ref Range    Hold Specimen JIC    Extra Red Top Tube     Status: None   Result Value Ref Range    Hold Specimen JIC    Extra Green Top (Lithium Heparin) Tube     Status: None   Result Value Ref Range    Hold Specimen JIC    Extra Purple Top Tube     Status: None   Result Value Ref Range    Hold Specimen JIC    Results for orders placed or performed during the hospital encounter of 06/09/23   Head CT w/o contrast     Status: None    Narrative    EXAM: CT HEAD W/O CONTRAST, CT CERVICAL SPINE W/O CONTRAST, CT FACIAL BONES WITHOUT CONTRAST  LOCATION: [facility]  DATE/TIME: 6/9/2023 5:54 PM CDT    INDICATION: Head trauma  COMPARISON: MRI brain 10/26/2018  TECHNIQUE:   1) Routine CT Head without IV contrast. Multiplanar reformats. Dose reduction techniques were used.  2) Routine CT Facial Bones without IV contrast. Multiplanar reformats. Dose reduction techniques were used.  3) Routine CT Cervical Spine  without IV contrast. Multiplanar reformats. Dose reduction techniques were used.    FINDINGS:  HEAD CT:   INTRACRANIAL CONTENTS: Scattered small volume subarachnoid hemorrhage, most pronounced along the sylvian fissures bilaterally. No mass effect or midline shift. No CT evidence of acute infarct. Mild presumed chronic small vessel ischemic changes. Mild   generalized volume loss. No hydrocephalus.     BONES/SOFT TISSUES: No large scalp hematoma. No skull fracture. Diffuse sclerotic changes.    FACIAL BONE CT:   OSSEOUS STRUCTURES/SOFT TISSUES:  Left periorbital/malar soft tissue hematoma.  Left orbital floor blowout fracture with small volume of extraconal hemorrhage along the orbital floor. The orbital rim is intact. Thickened and rounded appearance of the   left inferior rectus muscle at the level of the fracture. Multifocal dental decay. Severe left TMJ arthropathy with intra-articular bodies in the joint space. Diffuse sclerotic changes.    ORBITAL CONTENTS: Bilateral lens replacement.  The globes are intact.     SINUSES:  Posttraumatic hemorrhage in the left maxillary sinus.     CERVICAL SPINE CT:  VERTEBRA: Normal vertebral body heights. Asymmetry of the C1-C2 articulation likely related to advanced degenerative changes, left greater than right. A retrodental pannus is noted. No spondylolisthesis. No acute compression fracture or posttraumatic   subluxation. Diffuse sclerotic changes.    CANAL/FORAMINA: Multilevel spondylosis without high grade canal stenosis.    PARASPINAL: No prevertebral edema.       Impression    IMPRESSION:  HEAD CT:  1.  Scattered small volume subarachnoid hemorrhage.   2.  No mass effect, midline shift or calvarial fracture.    FACIAL BONE CT:  1.  Left orbital floor blowout fracture with distorted appearance of the left inferior rectus muscle. This could be related to intramuscular injury or herniation. Clinical correlation for extraocular muscle entrapment is recommended.  2.  Small  volume of extraconal hemorrhage along the floor of the left orbit. This does not extend to the orbital apex.  3.  Posttraumatic hemorrhage in the left maxillary sinus and left periorbital soft tissue hematoma.    CERVICAL SPINE CT:  1.  No acute cervical spine fracture.  2.  Widespread osseous metastases.    Critical Result: Acute intracranial hemorrhage and possible left inferior rectus muscle entrapment    Finding was identified on 6/9/2023 6:08 PM CDT.    Dr. Drew Henry was contacted by me on 6/9/2023 6:14 PM CDT.   Cervical spine CT w/o contrast     Status: None    Narrative    EXAM: CT HEAD W/O CONTRAST, CT CERVICAL SPINE W/O CONTRAST, CT FACIAL BONES WITHOUT CONTRAST  LOCATION: [facility]  DATE/TIME: 6/9/2023 5:54 PM CDT    INDICATION: Head trauma  COMPARISON: MRI brain 10/26/2018  TECHNIQUE:   1) Routine CT Head without IV contrast. Multiplanar reformats. Dose reduction techniques were used.  2) Routine CT Facial Bones without IV contrast. Multiplanar reformats. Dose reduction techniques were used.  3) Routine CT Cervical Spine without IV contrast. Multiplanar reformats. Dose reduction techniques were used.    FINDINGS:  HEAD CT:   INTRACRANIAL CONTENTS: Scattered small volume subarachnoid hemorrhage, most pronounced along the sylvian fissures bilaterally. No mass effect or midline shift. No CT evidence of acute infarct. Mild presumed chronic small vessel ischemic changes. Mild   generalized volume loss. No hydrocephalus.     BONES/SOFT TISSUES: No large scalp hematoma. No skull fracture. Diffuse sclerotic changes.    FACIAL BONE CT:   OSSEOUS STRUCTURES/SOFT TISSUES:  Left periorbital/malar soft tissue hematoma.  Left orbital floor blowout fracture with small volume of extraconal hemorrhage along the orbital floor. The orbital rim is intact. Thickened and rounded appearance of the   left inferior rectus muscle at the level of the fracture. Multifocal dental decay. Severe left  TMJ arthropathy with intra-articular bodies in the joint space. Diffuse sclerotic changes.    ORBITAL CONTENTS: Bilateral lens replacement.  The globes are intact.     SINUSES:  Posttraumatic hemorrhage in the left maxillary sinus.     CERVICAL SPINE CT:  VERTEBRA: Normal vertebral body heights. Asymmetry of the C1-C2 articulation likely related to advanced degenerative changes, left greater than right. A retrodental pannus is noted. No spondylolisthesis. No acute compression fracture or posttraumatic   subluxation. Diffuse sclerotic changes.    CANAL/FORAMINA: Multilevel spondylosis without high grade canal stenosis.    PARASPINAL: No prevertebral edema.       Impression    IMPRESSION:  HEAD CT:  1.  Scattered small volume subarachnoid hemorrhage.   2.  No mass effect, midline shift or calvarial fracture.    FACIAL BONE CT:  1.  Left orbital floor blowout fracture with distorted appearance of the left inferior rectus muscle. This could be related to intramuscular injury or herniation. Clinical correlation for extraocular muscle entrapment is recommended.  2.  Small volume of extraconal hemorrhage along the floor of the left orbit. This does not extend to the orbital apex.  3.  Posttraumatic hemorrhage in the left maxillary sinus and left periorbital soft tissue hematoma.    CERVICAL SPINE CT:  1.  No acute cervical spine fracture.  2.  Widespread osseous metastases.    Critical Result: Acute intracranial hemorrhage and possible left inferior rectus muscle entrapment    Finding was identified on 6/9/2023 6:08 PM CDT.    Dr. Drew Henry was contacted by me on 6/9/2023 6:14 PM CDT.   CT Facial Bones without Contrast     Status: None    Narrative    EXAM: CT HEAD W/O CONTRAST, CT CERVICAL SPINE W/O CONTRAST, CT FACIAL BONES WITHOUT CONTRAST  LOCATION: [facility]  DATE/TIME: 6/9/2023 5:54 PM CDT    INDICATION: Head trauma  COMPARISON: MRI brain 10/26/2018  TECHNIQUE:   1) Routine CT Head  without IV contrast. Multiplanar reformats. Dose reduction techniques were used.  2) Routine CT Facial Bones without IV contrast. Multiplanar reformats. Dose reduction techniques were used.  3) Routine CT Cervical Spine without IV contrast. Multiplanar reformats. Dose reduction techniques were used.    FINDINGS:  HEAD CT:   INTRACRANIAL CONTENTS: Scattered small volume subarachnoid hemorrhage, most pronounced along the sylvian fissures bilaterally. No mass effect or midline shift. No CT evidence of acute infarct. Mild presumed chronic small vessel ischemic changes. Mild   generalized volume loss. No hydrocephalus.     BONES/SOFT TISSUES: No large scalp hematoma. No skull fracture. Diffuse sclerotic changes.    FACIAL BONE CT:   OSSEOUS STRUCTURES/SOFT TISSUES:  Left periorbital/malar soft tissue hematoma.  Left orbital floor blowout fracture with small volume of extraconal hemorrhage along the orbital floor. The orbital rim is intact. Thickened and rounded appearance of the   left inferior rectus muscle at the level of the fracture. Multifocal dental decay. Severe left TMJ arthropathy with intra-articular bodies in the joint space. Diffuse sclerotic changes.    ORBITAL CONTENTS: Bilateral lens replacement.  The globes are intact.     SINUSES:  Posttraumatic hemorrhage in the left maxillary sinus.     CERVICAL SPINE CT:  VERTEBRA: Normal vertebral body heights. Asymmetry of the C1-C2 articulation likely related to advanced degenerative changes, left greater than right. A retrodental pannus is noted. No spondylolisthesis. No acute compression fracture or posttraumatic   subluxation. Diffuse sclerotic changes.    CANAL/FORAMINA: Multilevel spondylosis without high grade canal stenosis.    PARASPINAL: No prevertebral edema.       Impression    IMPRESSION:  HEAD CT:  1.  Scattered small volume subarachnoid hemorrhage.   2.  No mass effect, midline shift or calvarial fracture.    FACIAL BONE CT:  1.  Left orbital floor  blowout fracture with distorted appearance of the left inferior rectus muscle. This could be related to intramuscular injury or herniation. Clinical correlation for extraocular muscle entrapment is recommended.  2.  Small volume of extraconal hemorrhage along the floor of the left orbit. This does not extend to the orbital apex.  3.  Posttraumatic hemorrhage in the left maxillary sinus and left periorbital soft tissue hematoma.    CERVICAL SPINE CT:  1.  No acute cervical spine fracture.  2.  Widespread osseous metastases.    Critical Result: Acute intracranial hemorrhage and possible left inferior rectus muscle entrapment    Finding was identified on 6/9/2023 6:08 PM CDT.    Dr. Drew Henry was contacted by me on 6/9/2023 6:14 PM CDT.   Lumbar spine CT w/o contrast     Status: None    Narrative    EXAM: CT LUMBAR SPINE W/O CONTRAST  LOCATION: [facility]  DATE/TIME: 6/9/2023 6:07 PM CDT    INDICATION:  Tenderness, pain after fall  COMPARISON: None.  TECHNIQUE: Routine CT Lumbar Spine without IV contrast. Multiplanar reformats. Dose reduction techniques were used.     FINDINGS:  VERTEBRA:  Widespread blastic metastases. Normal vertebral body heights. Alignment is unremarkable. No acute compression fracture or posttraumatic subluxation.     CANAL/FORAMINA: Multilevel spondylosis superimposed on developmental canal narrowing with moderate L1-L2, severe L2-L3, severe L3-L4, and severe L4-L5 canal stenosis. Severe bilateral L4-L5 foraminal stenosis.    PARASPINAL: No acute extraspinal abnormality. Large right simple renal cyst, requiring no follow-up.      Impression    IMPRESSION:  1.  No acute lumbar spine fracture.  2.  Widespread blastic metastases.  3.  Degenerative changes with multilevel severe canal stenosis, as detailed above.   XR Chest 1 View     Status: None    Narrative    EXAM: XR CHEST 1 VIEW  LOCATION: [facility]  DATE/TIME: 6/9/2023 6:23 PM  CDT    INDICATION: trauma  COMPARISON: None.      Impression    IMPRESSION: Diffuse sclerosis of the visualized bony skeleton. Advanced degenerative changes most marked in the right shoulder. Mild thoracic spinal curvature. Surgical clips projected over the lateral aspect of the mid right chest wall. No obvious acute   finding such as pneumothorax or displaced bony fracture.   XR Pelvis 1/2 Views     Status: None    Narrative    EXAM: XR PELVIS 1/2 VIEWS  LOCATION: [facility]  DATE/TIME: 6/9/2023 6:23 PM CDT    INDICATION: Trauma and pain.  COMPARISON: None.      Impression    IMPRESSION: Diffuse sclerosis and heterogeneity of the bone is consistent with the patient's known history of metastatic breast cancer. There is a left total hip replacement. There is no evidence of an acute displaced fracture or dislocation on the   single AP view. Atherosclerotic vascular calcifications.   XR Knee Right 1/2 Views     Status: None    Narrative    EXAM: XR KNEE RIGHT 1/2 VIEWS  LOCATION: [facility]  DATE/TIME: 6/9/2023 6:23 PM CDT    INDICATION: Trauma and pain.  COMPARISON: None.      Impression    IMPRESSION: Diffuse sclerosis and heterogeneity of the bones consistent with the patient's known history of metastatic breast cancer. Mild tricompartmental degenerative changes of the right knee. No evidence of a fracture or joint effusion.   Atherosclerotic vascular calcifications.   INR     Status: Normal   Result Value Ref Range    INR 1.03 0.85 - 1.15   Basic metabolic panel     Status: Abnormal   Result Value Ref Range    Sodium 135 (L) 136 - 145 mmol/L    Potassium 3.9 3.4 - 5.3 mmol/L    Chloride 98 98 - 107 mmol/L    Carbon Dioxide (CO2) 24 22 - 29 mmol/L    Anion Gap 13 7 - 15 mmol/L    Urea Nitrogen 24.6 (H) 8.0 - 23.0 mg/dL    Creatinine 1.34 (H) 0.51 - 0.95 mg/dL    Calcium 9.6 8.8 - 10.2 mg/dL    Glucose 118 (H) 70 - 99 mg/dL    GFR Estimate 41 (L) >60 mL/min/1.73m2   CBC  with platelets and differential     Status: Abnormal   Result Value Ref Range    WBC Count 3.3 (L) 4.0 - 11.0 10e3/uL    RBC Count 2.47 (L) 3.80 - 5.20 10e6/uL    Hemoglobin 10.4 (L) 11.7 - 15.7 g/dL    Hematocrit 29.7 (L) 35.0 - 47.0 %     (H) 78 - 100 fL    MCH 42.1 (H) 26.5 - 33.0 pg    MCHC 35.0 31.5 - 36.5 g/dL    RDW 12.7 10.0 - 15.0 %    Platelet Count 104 (L) 150 - 450 10e3/uL    % Neutrophils 55 %    % Lymphocytes 19 %    % Monocytes 23 %    % Eosinophils 1 %    % Basophils 2 %    % Immature Granulocytes 0 %    NRBCs per 100 WBC 0 <1 /100    Absolute Neutrophils 1.8 1.6 - 8.3 10e3/uL    Absolute Lymphocytes 0.6 (L) 0.8 - 5.3 10e3/uL    Absolute Monocytes 0.8 0.0 - 1.3 10e3/uL    Absolute Eosinophils 0.0 0.0 - 0.7 10e3/uL    Absolute Basophils 0.1 0.0 - 0.2 10e3/uL    Absolute Immature Granulocytes 0.0 <=0.4 10e3/uL    Absolute NRBCs 0.0 10e3/uL   RBC and Platelet Morphology     Status: None   Result Value Ref Range    Platelet Assessment  Automated Count Confirmed. Platelet morphology is normal.     Automated Count Confirmed. Platelet morphology is normal.    RBC Morphology Confirmed RBC Indices    Adult Type and Screen     Status: None   Result Value Ref Range    ABO/RH(D) A POS     Antibody Screen Negative Negative    SPECIMEN EXPIRATION DATE [id number]    CBC with platelets differential     Status: Abnormal    Narrative    The following orders were created for panel order CBC with platelets differential.  Procedure                               Abnormality         Status                     ---------                               -----------         ------                     CBC with platelets and d...[[id number]]  Abnormal            Final result               RBC and Platelet Morphology[[id number]]                      Final result                 Please view results for these tests on the individual orders.   ABO/Rh type and screen     Status: None    Narrative    The following orders were  created for panel order ABO/Rh type and screen.  Procedure                               Abnormality         Status                     ---------                               -----------         ------                     Adult Type and Screen[[id number]]                            Final result                 Please view results for these tests on the individual orders.     Medications   fentaNYL (PF) (SUBLIMAZE) injection 50 mcg (has no administration in time range)     Labs Ordered and Resulted from Time of ED Arrival to Time of ED Departure - No data to display  CT Head w/o Contrast   Preliminary Result   IMPRESSION:   1.  Evolving multicompartmental subarachnoid hemorrhage, as above.   2.  Question small focus of intraparenchymal hemorrhage at the frontotemporal junction measuring 0.5 cm versus increased subarachnoid hemorrhage. Continued attention on follow-up imaging is recommended.      CT Thoracic Spine w/o Contrast   Preliminary Result   IMPRESSION:   1.  No fracture or posttraumatic subluxation.   2.  No high-grade spinal canal or neural foraminal stenosis.   3.  Diffuse sclerotic osseous metastatic disease.   4.  Incompletely imaged cystic lesion involving the right kidney measuring up to 7.1 cm. Recommend follow-up nonemergent renal ultrasound for further evaluation.                Critical care was not performed.     Medical Decision Making  The patient's presentation was of high complexity (an acute health issue posing potential threat to life or bodily function).    The patient's evaluation involved:  review of external note(s) from 1 sources (transfer note)  discussion of management or test interpretation with another health professional (trauma, nsg)    The patient's management necessitated high risk (a decision regarding hospitalization).      Assessment & Plan    MDM  Patient presenting after sustaining subarachnoid hemorrhage and orbital fracture.  No evidence of entrapment clinically right now,  discussed with ophthalmology who will see the patient.  Patient admitted to trauma.  Neurosurgery recommended keeping blood pressure under 140, no Keppra at this point.  Repeat CT is done.  Patient agrees with plan.  Pain medications offered and given.    I have reviewed the nursing notes. I have reviewed the findings, diagnosis, plan and need for follow up with the patient.    New Prescriptions    No medications on file       Final diagnoses:   Fall, initial encounter   SAH (subarachnoid hemorrhage) (H)   Orbital floor (blow-out) closed fracture (H)       Jj Desouza  [facility] EMERGENCY DEPARTMENT  6/9/2023     Jj Desouza MD  06/10/23 [id number]

## 2023-06-10 NOTE — CONSULTS
[facility]       NEUROSURGERY CONSULTATION NOTE    This consultation was requested by Dr. Desouza from the ED service.    Reason for Consultation: Traumatic SAH    HPI:    Argelia Villarreal is a 78/F with metastatic breast cancer with CKD III, on ASA, who now presents to [facility] ED as a transfer from [facility] after a fall around 2:30 PM on 6.9.2023 when she was walking to get the mail when she fell and hit her head. She did not report her consciousness. Imaging revealed scattered multifocal SAH, left orbital floor blowout fracture, left periorbital ecchymosis, left maxillary sinus hemorrhage. Neurosurgery team was consulted for evaluation and management of traumatic SAH. Patient reports headaches, with no weakness or numbness in upper or lower extremities. No reports of bladder or bowel incontinence.     PAST MEDICAL HISTORY: History reviewed. No pertinent past medical history.    PAST SURGICAL HISTORY: No past surgical history on file.    FAMILY HISTORY: No family history on file.    SOCIAL HISTORY:   Social History     Tobacco Use     Smoking status: Not on file     Smokeless tobacco: Not on file   Vaping Use     Vaping status: Not on file   Substance Use Topics     Alcohol use: Not on file       MEDICATIONS:  Current Outpatient Medications   Medication Sig Dispense Refill     Ascorbic Acid (VITAMIN C) 500 MG CAPS Take 500 mg by mouth At Bedtime       aspirin 81 MG EC tablet Take 81 mg by mouth daily       atenolol (TENORMIN) 25 MG tablet Take 25 mg by mouth daily       Biotin 03749 MCG TABS Take 1 tablet by mouth 2 times daily       BLACK ELDERBERRY PO Take 100 mg by mouth daily       cholecalciferol (VITAMIN D3) 25 mcg (1000 units) capsule Take 1 capsule by mouth daily       co-enzyme Q-10 100 MG CAPS capsule Take 100 mg by mouth daily       Cranberry-Cholecalciferol 4200-500 MG-UNIT CAPS Take 1 capsule by mouth daily       cyanocobalamin (VITAMIN B-12) 1000 MCG tablet  Take 1,000 mcg by mouth daily       doxycycline monohydrate (MONODOX) 100 MG capsule Take 100 mg by mouth daily as needed (when on chemo)       ferrous sulfate (FEROSUL) 325 (65 Fe) MG tablet Take 325 mg by mouth 2 times daily       fish oil-omega-3 fatty acids 1000 MG capsule Take 1 g by mouth every morning       Folic Acid (FOLATE PO) Take 800 mcg by mouth 2 times daily       Garlic 1000 MG CAPS Take 1 capsule by mouth daily       losartan (COZAAR) 50 MG tablet Take 50 mg by mouth daily       magnesium 250 MG tablet Take 1 tablet by mouth daily       Multiple Vitamins-Minerals (ICAPS AREDS 2 PO) Take 1 capsule by mouth 2 times daily       palbociclib (IBRANCE) 100 MG capsule Take 100 mg by mouth daily with food 21 days on  7 days off       Probiotic Product (PROBIOTIC-10 PO) Take 1 tablet by mouth daily       triamterene-HCTZ (MAXZIDE-25) 37.5-25 MG tablet Take 1 tablet by mouth daily       verapamil ER (CALAN-SR) 180 MG CR tablet Take 180 mg by mouth At Bedtime         Allergies:  Allergies   Allergen Reactions     Lisinopril Cough     Morphine Hives       ROS: 10 point ROS of systems including Constitutional, Eyes, Respiratory, Cardiovascular, Gastroenterology, Genitourinary, Integumentary, Muscularskeletal, Psychiatric were all negative except for pertinent positives noted in my HPI.    Physical exam:   Blood pressure (!) 140/64, pulse 61, temperature 98  F (36.7  C), temperature source Oral, resp. rate 13, SpO2 99 %.  General: awake and alert  HEENT: Left periorbital hematoma  PULM: breathing comfortably on room air  NEUROLOGIC:  -- Awake; Alert; oriented x 3  -- Follows commands briskly  -- +repetition, calculation, and naming  -- Speech fluent, spontaneous. No aphasia or dysarthria.  -- no gaze preference. No apparent hemineglect.  Cranial Nerves:  -- visual fields full to confrontation, PERRL 3-2mm bilat and brisk, extraocular movements intact  -- face symmetrical, tongue midline  -- sensory V1-V3 intact  bilaterally  -- palate elevates symmetrically, uvula midline  -- hearing grossly intact bilat  -- Trapezii 5/5 strength bilat symmetric  -- Cerebellar: Finger nose finger without dysmetria, intact rapid alternating motions bilaterally    Motor:  Normal bulk / tone; no tremor, rigidity, or bradykinesia.  No muscle wasting or fasciculations  No Pronator Drift     Delt Bi Tri Hand Flexion/  Extension Iliopsoas Quadriceps Hamstrings Tibialis Anterior Gastroc    C5 C6 C7 C8/T1 L2 L3 L4-S1 L4 S1   R 3* 5 5 5 5 4* 5 5 5   L 5 5 5 5 5 5 5 5 5     *: pain limited  Sensory:  intact to LT x 4 extremities     Reflexes:       Bi Tri BR Mercedes Pat Ach Bab     C5-6 C7-8 C6 UMN L2-4 S1 UMN   R 2+ 2+ 2+ Norm 2+ 2+ Norm   L 2+ 2+ 2+ Norm 2+ 2+ Norm      Gait: Deferred    IMAGING:  CT Head: traumatic SAH- multifocal    LABS:   Last Comprehensive Metabolic Panel:  Lab Results   Component Value Date     (L) 06/09/2023    POTASSIUM 3.9 06/09/2023    CHLORIDE 98 06/09/2023    CO2 24 06/09/2023    ANIONGAP 13 06/09/2023     (H) 06/09/2023    BUN 24.6 (H) 06/09/2023    CR 1.34 (H) 06/09/2023    GFRESTIMATED 41 (L) 06/09/2023    DELANO 9.6 06/09/2023         Lab Results   Component Value Date    WBC 3.3 06/09/2023     Lab Results   Component Value Date    RBC 2.47 06/09/2023     Lab Results   Component Value Date    HGB 10.4 06/09/2023     Lab Results   Component Value Date    HCT 29.7 06/09/2023     Lab Results   Component Value Date     06/09/2023     Lab Results   Component Value Date    MCH 42.1 06/09/2023     Lab Results   Component Value Date    MCHC 35.0 06/09/2023     Lab Results   Component Value Date    RDW 12.7 06/09/2023     Lab Results   Component Value Date     06/09/2023     INR   Date Value Ref Range Status   06/09/2023 1.03 0.85 - 1.15 Final      ASSESSMENT:    78/F, on ASA who now presents for evaluation and management of traumatic multifocal SAH, neurologically intact on  examination.    RECOMMENDATIONS:    No neurosurgical intervention indicated at this time   Repeat head CT in 6 hours from the time of first acquisition  Hold off ASA for now  SBP < 140  HOB > 30 degrees  Serial neuro exams   Pain control  Platelets > 100,000  INR < 1.5  Hemoglobin > 8  DVT: SCDs while in bed    [facility]  Neurosurgery Resident, PGY-2    The patient was discussed with Dr. Shirley, neurosurgery chief resident.

## 2023-06-10 NOTE — PLAN OF CARE
/66   Pulse 60   Temp 98  F (36.7  C) (Temporal)   Resp 16   Wt 94 kg (207 lb 3.7 oz)   SpO2 99%      Pt alert and oriented x 4. Denies SOB. On 1L O2 nc. Reports pain 8/10, PRN IV 0.4 dilaudid given x 2. Purewick in place, voiding adequately. LR infusing @ 100 ml/hr. Family at bedside. Transferred to [facility]

## 2023-06-10 NOTE — PROGRESS NOTES
Received a call in the hospitalist office from on-call ophthalmology, Dr. Claros.  I spoke with Dr. Claros, who requested that the patient be transferred so that they are able to evaluate her based on her injuries and symptoms.  Please see H&P by Chloe Sapp for details regarding patient case.      I spoke with Dr. Henry in the ED who agreed to follow up regarding this transfer.  Admit order was canceled for now as this would complicate her potential transfer, Plan for transfer to [facility] for ongoing evaluations. ED will attempt ED to ED transfer to facilitate her cares. Appreciate assistance of ED in coordination of this.     Lita Oliva,  11:25 PM

## 2023-06-10 NOTE — PHARMACY-ADMISSION MEDICATION HISTORY
Pharmacist Admission Medication History    Admission medication history is complete. The information provided in this note is only as accurate as the sources available at the time of the update.    Medication reconciliation/reorder completed by provider prior to medication history? No    Information Source(s): Family member via in-person    Pertinent Information: none    Changes made to PTA medication list:    Added: vitamins/supplements, doxycycline    Deleted: None    Changed: None    Medication Affordability:  Not including over the counter (OTC) medications, was there a time in the past 3 months when you did not take your medications as prescribed because of cost?: No    Allergies reviewed with patient and updates made in EHR: yes    Medication History Completed By: Malvin Painting RPH 6/9/2023 8:51 PM    Prior to Admission medications    Medication Sig Last Dose Taking? Auth Provider Long Term End Date   Ascorbic Acid (VITAMIN C) 500 MG CAPS Take 500 mg by mouth At Bedtime 6/8/2023 Yes Unknown, Entered By History     aspirin 81 MG EC tablet Take 81 mg by mouth daily 6/9/2023 at am Yes Unknown, Entered By History     atenolol (TENORMIN) 25 MG tablet Take 25 mg by mouth daily 6/9/2023 at am Yes Reported, Patient Yes    Biotin 65766 MCG TABS Take 1 tablet by mouth 2 times daily 6/9/2023 at x1 Yes Unknown, Entered By History     BLACK ELDERBERRY PO Take 100 mg by mouth daily 6/9/2023 at am Yes Unknown, Entered By History     cholecalciferol (VITAMIN D3) 25 mcg (1000 units) capsule Take 1 capsule by mouth daily 6/9/2023 at noon Yes Unknown, Entered By History     co-enzyme Q-10 100 MG CAPS capsule Take 100 mg by mouth daily 6/9/2023 at noon Yes Unknown, Entered By History     Cranberry-Cholecalciferol 4200-500 MG-UNIT CAPS Take 1 capsule by mouth daily 6/9/2023 at am Yes Unknown, Entered By History     cyanocobalamin (VITAMIN B-12) 1000 MCG tablet Take 1,000 mcg by mouth daily 6/8/2023 at hs Yes Unknown, Entered By  History     doxycycline monohydrate (MONODOX) 100 MG capsule Take 100 mg by mouth daily as needed (when on chemo)  Yes Unknown, Entered By History     ferrous sulfate (FEROSUL) 325 (65 Fe) MG tablet Take 325 mg by mouth 2 times daily 6/9/2023 at x1 Yes Unknown, Entered By History     fish oil-omega-3 fatty acids 1000 MG capsule Take 1 g by mouth every morning 6/9/2023 Yes Unknown, Entered By History     Folic Acid (FOLATE PO) Take 800 mcg by mouth 2 times daily 6/9/2023 at x1 Yes Unknown, Entered By History     Garlic 1000 MG CAPS Take 1 capsule by mouth daily 6/9/2023 at am Yes Unknown, Entered By History     losartan (COZAAR) 50 MG tablet Take 50 mg by mouth daily 6/8/2023 at hs Yes Reported, Patient Yes    magnesium 250 MG tablet Take 1 tablet by mouth daily 6/9/2023 at am Yes Unknown, Entered By History     Multiple Vitamins-Minerals (ICAPS AREDS 2 PO) Take 1 capsule by mouth 2 times daily 6/9/2023 at x1 Yes Unknown, Entered By History     palbociclib (IBRANCE) 100 MG capsule Take 100 mg by mouth daily with food 21 days on  7 days off off till Sunday 6/11/23 Yes Reported, Patient     Probiotic Product (PROBIOTIC-10 PO) Take 1 tablet by mouth daily 6/9/2023 at noon Yes Unknown, Entered By History     triamterene-HCTZ (MAXZIDE-25) 37.5-25 MG tablet Take 1 tablet by mouth daily 6/9/2023 at am Yes Reported, Patient Yes    verapamil ER (CALAN-SR) 180 MG CR tablet Take 180 mg by mouth At Bedtime 6/8/2023 at hs Yes Reported, Patient Yes

## 2023-06-10 NOTE — PROGRESS NOTES
BP (!) 140/56   Pulse 63   Temp 97.8  F (36.6  C) (Oral)   Resp 16   SpO2 100%   Neuro: A&Ox4. No neuro changes.  Cardiac: Afebrile, VSS.   Respiratory: 2L NC.  GI/: Voiding spontaneously. No BM this shift.   Diet/appetite: Tolerating diet. Some nausea in am, but now subsided.  Activity: A-2  Pain: Agreeable with pain regimen.  Skin: No new deficits noted.  Lines: PIV x2  Drains: Pure wick  No new complaints.Will continue to monitor and follow plan of care.

## 2023-06-10 NOTE — PROGRESS NOTES
Tertiary Survey Progress Note     Date of Service: 06/10/2023    Trauma Mechanism: GLF  Time/date of injury: 1430 on 06/09  Known Injuries:  1. SAH (scattered, small volume, most pronounced along bilateral sylvian fissures)  2. Left periorbital hematoma  3. Left orbital floor blowout fracture with small volume hemorrhage along orbital floor  4. Left maxillary sinus hemorrhage        Assessment & Plan   Neuro/Pain/Psych:  # Fall, unknown LOC  # Traumatic SAH   # TBI: nausea, vomiting, headache   # Hx of migraine   - started prn: zofran, compazine, meclizine,   - Follow-up Head CT @ 0317: Evolving scattered subarachnoid hemorrhage. Question small focus of intraparenchymal hemorrhage at the frontotemporal junction measuring 0.5 cm versus increased subarachnoid hemorrhage.   - Repeat Head CT @ 930 stable  - Neurosurgery following: Keppra x 7 days, discontinued aspirin, no follow-up required     # Acute truamtic pain   - Scheduled: Tylenol, robaxin   - PRN: oxycodone 2.5-5, dilaudid IV  - Maintain circadian rhythm.  Lights on during the day.  Off at night, minimize cares at night.  OOB during the day.    EENT:  # Left periorbital hematoma  # Left orbital floor blowout fracture with small volume hemorrhage along orbital floor  # Left maxillary sinus hemorrhage   # s/p right eye cataract lab 10/25/22, left cataract removal 11/15/2022  - Keep upright as much as able to help with swelling  - Nasal precautions: no nose blowing, sneeze with mouth open, no heavy listing or straws  - Ophthalmology consulted: No emergent surgery indicated, Prednisone 60mg PO once now for pain/inflammation (if permitted in setting of SAH), Keflex 500 mg PO QID x 7 days then stop, Erythromycin ophthalmic ointment TID to left eye until follow up in clinic, Afrin BID PRN x 3 days, ice packs 20 min q1-2 hours x 24 hours. Follow up in occuloplastics clinic in 1-2 weeks - messaged  to  call patient and arrange      Pulmonary:  - Supplemental oxygen to keep saturation above 92 %.  - CXR: Diffuse sclerosis of the visualized bony skeleton. Advanced degenerative changes most marked in the right shoulder. Mild thoracic spinal curvature. Surgical clips projected over the lateral aspect of the mid right chest wall. No obvious acute finding such as pneumothorax or displaced bony fracture      Cardiovascular:    # Hypertension   - Monitor hemodynamic status.   - continue PTA: Atenolol 25, losartan, triamterene -hctz 37.5-25, verapamil 180    GI/Nutrition:    - Regular diet    Renal/ Fluids/Electrolytes:  # CKD3  # Hyponatremia   # Incedental finding Right kidney lesion,  - Thoracic spine CT: Incompletely imaged cystic lesion involving the right kidney measuring up to 7.1 cm. Recommend follow-up nonemergent renal ultrasound for further evaluation.  - NS for IV fluid hydration.   - continue PTA: magnesium gluconate  - electrolyte replacement protocol in place.     Endocrine:  # Stress hyperglycemia   - No management indication.     Infectious disease:   -  No indications for antibiotics.     Hematology/Oncology:    # Chemotherapy induced pancytopenia   - Hgb 10.4. Monitor and trend.   - Threshold for transfusion if hgb <7.0 or signs/symptoms of hypoperfusion.     - Platelet Count: 104  - continue PTA b12, ferrous sulfate, folic ace     # Platelet dysfunction 2/2 ASA  - Hold PTA ASA     # Stage 4 metastatic Breast cancer   # Diffuse osseous metastatic disease   - PTA Ibrance, 21 days on, 7 days off. Next dose 6/12  - Continue PTA: Oscal, Vit D     Musculoskeletal:  # s/p left THR  # Weakness and deconditioning of chronic illness   # Right knee pain   - Cspine CT: No acute cervical spine fracture. Widespread osseous metastases.  - Tspine CT: No fracture or posttraumatic subluxation. No high-grade spinal canal or neural foraminal stenosis. Diffuse sclerotic osseous metastatic disease.  - Lspine:No acute lumbar  spine fracture. Widespread blastic metastases. Degenerative changes with multilevel severe canal stenosis  - Pelvis Xray: Diffuse sclerosis and heterogeneity of the bone is consistent with the patient's known history of metastatic breast cancer. There is a left total hip replacement. There is no evidence of an acute displaced fracture or dislocation on thesingle AP view. Atherosclerotic vascular calcifications.  - Right knee xray: Diffuse sclerosis and heterogeneity of the bones consistent with the patient's known history of metastatic breast cancer. Mild tricompartmental degenerative changes of the right knee. No evidence of a fracture or joint effusion. Atherosclerotic vascular calcifications  - Physical and occupational therapy consults.    Skin:  # Ecchymosis   - dilgent cares to prevent skin breakdown and wound formation.      Lines/ tubes/ drains:  - PIV     General Cares:    PPI/H2 blocker:  NA   DVT prophylaxis: pcd insetting of acute ICH   Bowel Regimen/Date of last stool: in place   Pulmonary toilet: cough    Code status:  Full Code     Discharge goals:     Adequate pain management: in process    VSS x24 hours: NA    Hemoglobin stable x 48 hours: NA    Ambulating safely and/or therapy evals complete: in process    Drains/lines removed or plan in place to manage: yes    Teaching done:     Other:  Expected D/C date: 1 day     Theresa Suggs PA-C  To contact the trauma service use job code pager [phone],   Numeric texts or alpha text through [facility]      Interval History   Review of Systems   Skin: positive for bruising, lumps or bumps  Eyes: positive for traumatic eye pain, eyelid edema   Ears/Nose/Throat: negative  Respiratory: No shortness of breath, dyspnea on exertion, cough, or hemoptysis  Cardiovascular: negative  Gastrointestinal: negative  Genitourinary: negative  Musculoskeletal: positive for joint pain  Neurologic: negative  Psychiatric: negative  Hematologic/Lymphatic/Immunologic:  negative  Endocrine: negative     Physical Exam   Stephani Coma Scale - Total 15/15  Eye Response (E): 4   4= spontaneous, 3= to verbal/voice, 2= to pain, 1= No response   Verbal Response (V): 5   5= Orientated, converses, 4= Confused, converses, 3= Inappropriate words, 2= Incomprehensible sounds, 1=No response   Motor Response (M): 6   6= Obeys commands, 5= Localizes to pain, 4= Withdrawal to pain, 3=Fexion to pain, 2= Extension to pain, 1= No response     Frailty Questionnaire: To be done for all patients age 60+  F (Fatigue): Is the patient easily fatigued? YES = 1  R (Resistance): Is the patient unable to walk one flight of stairs? NO = 0  A (Ambulation): Is the patient unable to walk one block? NO = 0  I  (Illness): Does the patient have more than five illnesses? YES = 1  L (Loss of weight): Has the patient lost more than 5% of weight in the past 6 months. NO = 0  Lost five pounds or more in the last 3 months without trying? AND/OR Unintended weight loss?  Does the patient have difficulty performing housework such as washing windows or scrubbing floors? AND Activity in a typical 24-hour day- No moderate or vigorous activity    Score: 2    Score: 0-2: Ensure appropriate therapies consulted if needed     Physical Exam  Constitutional: Awake, alert, cooperative, no apparent distress.  Eyes: Left eyelid edema and ecchymosis. Right eye without injury   HENT: Normocephalic,   Respiratory: No increased work of breathing, good air exchange, clear to auscultation bilaterally,  Cardiovascular:  regular rate and rhythm,   GI: Abdomen soft, non-distended, non-tender, no guarding  Genitourinary:  Voids spont  Skin: Warm & dry  Musculoskeletal: There is no redness, warmth, or swelling of the joints.  Tenderness to right knee   Neurologic: Awake, alert, oriented. Strength and sensory is intact. No focal deficits.  Neuropsychiatric: Calm, normal eye contact, alert, affect appropriate to situation, oriented, thought process  normal.    Temp: 98.3  F (36.8  C) Temp src: Oral BP: 139/64 Pulse: 66   Resp: 10 SpO2: 100 % O2 Device: None (Room air)    There were no vitals filed for this visit.  Vital Signs with Ranges  Temp:  [98  F (36.7  C)-98.3  F (36.8  C)] 98.3  F (36.8  C)  Pulse:  [60-81] 66  Resp:  [10-18] 10  BP: (125-185)/(42-73) 139/64  SpO2:  [91 %-100 %] 100 %  No intake/output data recorded.

## 2023-06-10 NOTE — LETTER
[facility] [address] [address]  Phone: [phone]    June 13, 2023        Argelia Villarreal  [id number] [facility] [id number]          To whom it may concern:    RE: Caregiver for Argelia Villarreal      I am writing requesting time off for Janiya Mojica the daughter of Argelia Villarreal. Argelia was recently hospitalized and will require assistance at home until she fully recovers.     Anticipated time off: 1 week    Please contact me for questions or concerns.      Sincerely,        Mina Webb  Acute Care Trauma   Department of Acute and General Surgery   [facility]  [facility] [id number] [phone]      No name on file

## 2023-06-10 NOTE — CONSULTS
OPHTHALMOLOGY CONSULT NOTE  06/10/23    Patient: Argelia Villarreal      ASSESSMENT/PLAN:     Argelia Villarreal is a 78 year old female who presented with    # Orbital Floor Fracture, left   - Onset 6/9/23 fall from floor level in her drive way. Presented to [facility] ED with SAH and left orbital fracture on CT. Transferred to [facility] for ophthalmology evaluation.   - 06/10/23 Exam with VA 20/30 OU, EOM with upgaze limited in both eyes by pain in left eye but no bradycardia, nausea or vomiting with upgaze. Palpation of the rim showed no step-off or subcutaneous emphysema. Sensation intact to V1 and V2. No diplopia. SLE without hyphema, lacerations, iritis, evidence of globe. Fundus exam without commotio or VH/RD/RT.  - On CT, left eye inferior rectus is herniated down into fracture, possibly caught at one point inferiorly but does not appear entrapped. Globe intact.  - Large orbital floor fracture will likely require repair in outpatient setting. No indication for immediate repair. Will treat pain with a dose of oral steroids and can prescribe medrol dosepak if steroids felt to be beneficial (steroids only if permitted in the setting of SAH).     Recommendations:  - No emergent surgery indicated  - Prednisone 60mg PO once now for pain/inflammation (if permitted in setting of SAH)  - Keflex 500 mg PO QID x 7 days then stop  - Erythromycin ophthalmic ointment TID to left eye until follow up in clinic  - Sinus precautions including: no nose blowing, no straining, no heavy lifting, no bending for 2 weeks, sneezing with mouth open, no smoking, no using straws  - Afrin BID PRN x 3 days  -ice packs 20 min q1-2 hours x 24 hours  - Follow up in occuloplastics clinic in 1-2 weeks - messaged  to call patient and arrange    - Addendum: Given good EOM response to single dose of prednisone, will recommend medrol dosepak. (Ordered for you)    It is our pleasure to participate in this patient's care and treatment.  Please contact us with any further questions or concerns.    Seen with Dr. Gosia Kowalski and discussed with Dr. Jennifer Crockett who agreed with this assessment and plan.     MD Gosia Groves MD  Resident Physician - PGY3  Department of Ophthalmology   [facility]    HISTORY OF PRESENTING ILLNESS:     Argelia Villarreal is a 78 year old female who presented on 6/10/23 with orbital blow out fracture and SAH after a fall in her driveway 6/9/23 afternoon. She was seen at outside ED who intially noted periorbital edema but an otherwise unremarkable exam. Patient was admitted to [facility] but a PA who admitted the patient thought there was some EOM deficit. Patient was thus transferred this morning for ophthalmology evaluation due to concern for EOM deficit.     Patient denies pain, blurry vision, or double vision. Endorses mild headache.     10+ review of systems were otherwise negative except for that which has been stated above.      OCULAR/MEDICAL/SURGICAL HISTORIES:     Past Ocular History:   Pseuophakia each eye   Dry AMD each eye       Eye Drops:   none    Pertinent Systemic Medications:   None    Past Medical History:  History reviewed. No pertinent past medical history.    Past Surgical History:   No past surgical history on file.    Family History:  No family history on file.,    Social History:  Social History     Socioeconomic History     Marital status:      Spouse name: Not on file     Number of children: Not on file     Years of education: Not on file     Highest education level: Not on file   Occupational History     Not on file   Tobacco Use     Smoking status: Not on file     Smokeless tobacco: Not on file   Vaping Use     Vaping status: Not on file   Substance and Sexual Activity     Alcohol use: Not on file     Drug use: Not on file     Sexual activity: Not on file   Other Topics Concern     Not on file   Social History Narrative     Not on file      Social Determinants of Health     Financial Resource Strain: Not on file   Food Insecurity: Not on file   Transportation Needs: Not on file   Physical Activity: Not on file   Stress: Not on file   Social Connections: Not on file   Intimate Partner Violence: Not on file   Housing Stability: Not on file         EXAMINATION:     Base Eye Exam     Visual Acuity (Snellen - Linear)       Right Left    Near cc 20/30 20/30          Tonometry (Tonopen, 10:15 AM)       Right Left    Pressure 17 18          Pupils       Pupils    Right PERRL    Left PERRL          Visual Fields       Left Right     Full Full          Extraocular Movement       Right Left     Abnormal, Ortho Abnormal, Ortho     -3 -3 -3   0  0   0 0 0    -3 -3 -3   0  0   0 0 0      Upgaze limited bilaterally by pain but no bradycardia/nausea/vomiting           Neuro/Psych     Oriented x3: Yes    Mood/Affect: Normal          Dilation     Both eyes: 1.0% Mydriacyl, 2.5% Lauro Synephrine @ 10:15 AM            Additional Tests     Color       Right Left    [place]sh[place]ara 9/11 9/11            Slit Lamp and Fundus Exam     External Exam       Right Left    External Normal abrasion of left cheek and left festoon as well as ecchymosis down to mouth          Slit Lamp Exam       Right Left    Lids/Lashes echymosis RUL ecchymosis and edema of SAY and LLL    Conjunctiva/Sclera White and quiet DILCIA temporal and superior    Cornea PEE Clear    Anterior Chamber Deep and quiet Deep and quiet, no hyphema, no cell    Iris Round and reactive, dilated Round and reactive, dilated, no peaking    Lens PCIOL well centered PCIOL well centered    Anterior Vitreous Normal no hemorrhage          Fundus Exam       Right Left    Disc Normal Normal    C/D Ratio 0.4 0.4    Macula few hard drusen few hard drusen    Vessels Normal Normal    Periphery Normal Normal, no commotio, no RD/RT                Labs/Studies/Imaging Performed  FACIAL BONE CT:  1.  Left orbital floor blowout fracture with  distorted appearance of the left inferior rectus muscle. This could be related to intramuscular injury or herniation. Clinical correlation for extraocular muscle entrapment is recommended.  2.  Small volume of extraconal hemorrhage along the floor of the left orbit. This does not extend to the orbital apex.  3.  Posttraumatic hemorrhage in the left maxillary sinus and left periorbital soft tissue hematoma.       Richard Boyd M.D.  PGY-2 Resident Physician   Department of Ophthalmology  06/10/23 9:40 AM   Pager: [phone]

## 2023-06-10 NOTE — INTERIM SUMMARY
"Spoke with multiple providers at [facility] regarding this patient. Initial report from ED suggested that the patient had normal visual acuity, full range of eye movements, and IOP of 15. The patient was to be admitted for monitoring of her SAH. On admission, received a call rom physician assistant Chloe Sapp who suggests there may be some \"slow gaze\" inferiorly. Uncertain what this means, but given that there is a concern it is reasonable to admit the patient at [facility] for monitoring/management of SAH. Ophthalmology will round on the patient in the AM once she is admitted to [facility] to evaluate this \"slow gaze\" inferiorly. Please page ophthalmology with any concerns.    Richard Boyd M.D.  PGY-2 Resident Physician  Department of Ophthalmology    "

## 2023-06-11 ENCOUNTER — APPOINTMENT (OUTPATIENT)
Dept: PHYSICAL THERAPY | Facility: CLINIC | Age: 78
DRG: 124 | End: 2023-06-11
Attending: STUDENT IN AN ORGANIZED HEALTH CARE EDUCATION/TRAINING PROGRAM
Payer: MEDICARE

## 2023-06-11 LAB
ALBUMIN SERPL BCG-MCNC: 3.4 G/DL (ref 3.5–5.2)
ALP SERPL-CCNC: 47 U/L (ref 35–104)
ALT SERPL W P-5'-P-CCNC: 10 U/L (ref 10–35)
ANION GAP SERPL CALCULATED.3IONS-SCNC: 10 MMOL/L (ref 7–15)
AST SERPL W P-5'-P-CCNC: 18 U/L (ref 10–35)
BILIRUB SERPL-MCNC: 0.2 MG/DL
BUN SERPL-MCNC: 19 MG/DL (ref 8–23)
CALCIUM SERPL-MCNC: 8.3 MG/DL (ref 8.8–10.2)
CHLORIDE SERPL-SCNC: 105 MMOL/L (ref 98–107)
CREAT SERPL-MCNC: 1.1 MG/DL (ref 0.51–0.95)
DEPRECATED HCO3 PLAS-SCNC: 23 MMOL/L (ref 22–29)
ERYTHROCYTE [DISTWIDTH] IN BLOOD BY AUTOMATED COUNT: 13 % (ref 10–15)
GFR SERPL CREATININE-BSD FRML MDRD: 51 ML/MIN/1.73M2
GLUCOSE BLDC GLUCOMTR-MCNC: 146 MG/DL (ref 70–99)
GLUCOSE SERPL-MCNC: 163 MG/DL (ref 70–99)
HCT VFR BLD AUTO: 24.7 % (ref 35–47)
HGB BLD-MCNC: 8.4 G/DL (ref 11.7–15.7)
HGB BLD-MCNC: 9.5 G/DL (ref 11.7–15.7)
HGB BLD-MCNC: 9.9 G/DL (ref 11.7–15.7)
MAGNESIUM SERPL-MCNC: 1.5 MG/DL (ref 1.7–2.3)
MCH RBC QN AUTO: 42.4 PG (ref 26.5–33)
MCHC RBC AUTO-ENTMCNC: 34 G/DL (ref 31.5–36.5)
MCV RBC AUTO: 125 FL (ref 78–100)
PLATELET # BLD AUTO: 79 10E3/UL (ref 150–450)
POTASSIUM SERPL-SCNC: 4.5 MMOL/L (ref 3.4–5.3)
PROT SERPL-MCNC: 5.8 G/DL (ref 6.4–8.3)
RBC # BLD AUTO: 1.98 10E6/UL (ref 3.8–5.2)
SODIUM SERPL-SCNC: 138 MMOL/L (ref 136–145)
WBC # BLD AUTO: 2.7 10E3/UL (ref 4–11)

## 2023-06-11 PROCEDURE — 80053 COMPREHEN METABOLIC PANEL: CPT | Performed by: STUDENT IN AN ORGANIZED HEALTH CARE EDUCATION/TRAINING PROGRAM

## 2023-06-11 PROCEDURE — 250N000012 HC RX MED GY IP 250 OP 636 PS 637: Performed by: STUDENT IN AN ORGANIZED HEALTH CARE EDUCATION/TRAINING PROGRAM

## 2023-06-11 PROCEDURE — 97530 THERAPEUTIC ACTIVITIES: CPT | Mod: GP

## 2023-06-11 PROCEDURE — 250N000013 HC RX MED GY IP 250 OP 250 PS 637: Performed by: PHYSICIAN ASSISTANT

## 2023-06-11 PROCEDURE — 258N000003 HC RX IP 258 OP 636: Performed by: EMERGENCY MEDICINE

## 2023-06-11 PROCEDURE — 83735 ASSAY OF MAGNESIUM: CPT | Performed by: SURGERY

## 2023-06-11 PROCEDURE — 250N000011 HC RX IP 250 OP 636: Performed by: SURGERY

## 2023-06-11 PROCEDURE — 250N000009 HC RX 250: Performed by: PHYSICIAN ASSISTANT

## 2023-06-11 PROCEDURE — 36415 COLL VENOUS BLD VENIPUNCTURE: CPT | Performed by: NURSE PRACTITIONER

## 2023-06-11 PROCEDURE — 85018 HEMOGLOBIN: CPT | Performed by: NURSE PRACTITIONER

## 2023-06-11 PROCEDURE — 36415 COLL VENOUS BLD VENIPUNCTURE: CPT | Performed by: STUDENT IN AN ORGANIZED HEALTH CARE EDUCATION/TRAINING PROGRAM

## 2023-06-11 PROCEDURE — 97162 PT EVAL MOD COMPLEX 30 MIN: CPT | Mod: GP

## 2023-06-11 PROCEDURE — 97116 GAIT TRAINING THERAPY: CPT | Mod: GP

## 2023-06-11 PROCEDURE — 250N000013 HC RX MED GY IP 250 OP 250 PS 637: Performed by: STUDENT IN AN ORGANIZED HEALTH CARE EDUCATION/TRAINING PROGRAM

## 2023-06-11 PROCEDURE — 250N000009 HC RX 250: Performed by: NURSE PRACTITIONER

## 2023-06-11 PROCEDURE — 99232 SBSQ HOSP IP/OBS MODERATE 35: CPT | Performed by: NURSE PRACTITIONER

## 2023-06-11 PROCEDURE — 85027 COMPLETE CBC AUTOMATED: CPT | Performed by: STUDENT IN AN ORGANIZED HEALTH CARE EDUCATION/TRAINING PROGRAM

## 2023-06-11 PROCEDURE — 36415 COLL VENOUS BLD VENIPUNCTURE: CPT | Performed by: SURGERY

## 2023-06-11 PROCEDURE — 250N000013 HC RX MED GY IP 250 OP 250 PS 637: Performed by: SURGERY

## 2023-06-11 PROCEDURE — 250N000013 HC RX MED GY IP 250 OP 250 PS 637: Performed by: NURSE PRACTITIONER

## 2023-06-11 PROCEDURE — 120N000003 HC R&B IMCU UMMC

## 2023-06-11 PROCEDURE — 82962 GLUCOSE BLOOD TEST: CPT

## 2023-06-11 RX ORDER — HYDRALAZINE HYDROCHLORIDE 20 MG/ML
10 INJECTION INTRAMUSCULAR; INTRAVENOUS EVERY 4 HOURS PRN
Status: DISCONTINUED | OUTPATIENT
Start: 2023-06-11 | End: 2023-06-13 | Stop reason: HOSPADM

## 2023-06-11 RX ORDER — LOSARTAN POTASSIUM 50 MG/1
50 TABLET ORAL AT BEDTIME
Status: DISCONTINUED | OUTPATIENT
Start: 2023-06-11 | End: 2023-06-12

## 2023-06-11 RX ORDER — MAGNESIUM SULFATE HEPTAHYDRATE 40 MG/ML
4 INJECTION, SOLUTION INTRAVENOUS ONCE
Status: COMPLETED | OUTPATIENT
Start: 2023-06-11 | End: 2023-06-11

## 2023-06-11 RX ORDER — VERAPAMIL HYDROCHLORIDE 180 MG/1
180 TABLET, EXTENDED RELEASE ORAL AT BEDTIME
Status: DISCONTINUED | OUTPATIENT
Start: 2023-06-11 | End: 2023-06-13 | Stop reason: HOSPADM

## 2023-06-11 RX ORDER — ATENOLOL 25 MG/1
25 TABLET ORAL DAILY
Status: DISCONTINUED | OUTPATIENT
Start: 2023-06-11 | End: 2023-06-13 | Stop reason: HOSPADM

## 2023-06-11 RX ORDER — OXYMETAZOLINE HYDROCHLORIDE 0.05 G/100ML
2 SPRAY NASAL 2 TIMES DAILY
Status: DISCONTINUED | OUTPATIENT
Start: 2023-06-11 | End: 2023-06-13 | Stop reason: HOSPADM

## 2023-06-11 RX ADMIN — SENNOSIDES AND DOCUSATE SODIUM 2 TABLET: 50; 8.6 TABLET ORAL at 07:40

## 2023-06-11 RX ADMIN — CYANOCOBALAMIN TAB 1000 MCG 1000 MCG: 1000 TAB at 07:40

## 2023-06-11 RX ADMIN — ATENOLOL 25 MG: 25 TABLET ORAL at 09:59

## 2023-06-11 RX ADMIN — OXYCODONE HYDROCHLORIDE 5 MG: 5 TABLET ORAL at 23:15

## 2023-06-11 RX ADMIN — Medication 25 MCG: at 07:40

## 2023-06-11 RX ADMIN — ACETAMINOPHEN 975 MG: 325 TABLET, FILM COATED ORAL at 14:38

## 2023-06-11 RX ADMIN — LOSARTAN POTASSIUM 50 MG: 50 TABLET, FILM COATED ORAL at 22:58

## 2023-06-11 RX ADMIN — METHYLPREDNISOLONE 4 MG: 4 TABLET ORAL at 17:25

## 2023-06-11 RX ADMIN — ACETAMINOPHEN 975 MG: 325 TABLET, FILM COATED ORAL at 07:40

## 2023-06-11 RX ADMIN — ERYTHROMYCIN 1 G: 5 OINTMENT OPHTHALMIC at 07:41

## 2023-06-11 RX ADMIN — LEVETIRACETAM 750 MG: 750 TABLET, FILM COATED ORAL at 07:40

## 2023-06-11 RX ADMIN — OXYCODONE HYDROCHLORIDE 5 MG: 5 TABLET ORAL at 09:59

## 2023-06-11 RX ADMIN — CALCIUM 500 MG: 500 TABLET ORAL at 07:41

## 2023-06-11 RX ADMIN — METHOCARBAMOL 500 MG: 500 TABLET ORAL at 07:41

## 2023-06-11 RX ADMIN — OXYMETAZOLINE HYDROCHLORIDE 2 SPRAY: 0.05 SPRAY NASAL at 07:41

## 2023-06-11 RX ADMIN — LOSARTAN POTASSIUM 50 MG: 50 TABLET, FILM COATED ORAL at 07:41

## 2023-06-11 RX ADMIN — CEPHALEXIN 250 MG: 250 CAPSULE ORAL at 11:29

## 2023-06-11 RX ADMIN — CEPHALEXIN 250 MG: 250 CAPSULE ORAL at 20:15

## 2023-06-11 RX ADMIN — OXYCODONE HYDROCHLORIDE 5 MG: 5 TABLET ORAL at 17:25

## 2023-06-11 RX ADMIN — MAGNESIUM SULFATE IN WATER 4 G: 40 INJECTION, SOLUTION INTRAVENOUS at 20:03

## 2023-06-11 RX ADMIN — METHYLPREDNISOLONE 4 MG: 4 TABLET ORAL at 11:29

## 2023-06-11 RX ADMIN — ERYTHROMYCIN 1 G: 5 OINTMENT OPHTHALMIC at 14:38

## 2023-06-11 RX ADMIN — METHYLPREDNISOLONE 4 MG: 4 TABLET ORAL at 07:41

## 2023-06-11 RX ADMIN — Medication 800 MCG: at 20:16

## 2023-06-11 RX ADMIN — CEPHALEXIN 250 MG: 250 CAPSULE ORAL at 07:39

## 2023-06-11 RX ADMIN — Medication 500 MG: at 07:40

## 2023-06-11 RX ADMIN — OXYCODONE HYDROCHLORIDE AND ACETAMINOPHEN 500 MG: 500 TABLET ORAL at 22:58

## 2023-06-11 RX ADMIN — SENNOSIDES AND DOCUSATE SODIUM 2 TABLET: 50; 8.6 TABLET ORAL at 20:13

## 2023-06-11 RX ADMIN — METHOCARBAMOL 500 MG: 500 TABLET ORAL at 11:29

## 2023-06-11 RX ADMIN — METHOCARBAMOL 500 MG: 500 TABLET ORAL at 20:14

## 2023-06-11 RX ADMIN — LEVETIRACETAM 750 MG: 750 TABLET, FILM COATED ORAL at 20:15

## 2023-06-11 RX ADMIN — VERAPAMIL HYDROCHLORIDE 180 MG: 180 TABLET, FILM COATED, EXTENDED RELEASE ORAL at 22:58

## 2023-06-11 RX ADMIN — Medication 800 MCG: at 07:40

## 2023-06-11 RX ADMIN — FERROUS SULFATE TAB 325 MG (65 MG ELEMENTAL FE) 325 MG: 325 (65 FE) TAB at 20:16

## 2023-06-11 RX ADMIN — SODIUM CHLORIDE: 9 INJECTION, SOLUTION INTRAVENOUS at 01:51

## 2023-06-11 RX ADMIN — OXYMETAZOLINE HYDROCHLORIDE 2 SPRAY: 0.05 SPRAY NASAL at 20:13

## 2023-06-11 RX ADMIN — ACETAMINOPHEN 975 MG: 325 TABLET, FILM COATED ORAL at 20:13

## 2023-06-11 RX ADMIN — FERROUS SULFATE TAB 325 MG (65 MG ELEMENTAL FE) 325 MG: 325 (65 FE) TAB at 07:40

## 2023-06-11 RX ADMIN — METHOCARBAMOL 500 MG: 500 TABLET ORAL at 16:24

## 2023-06-11 RX ADMIN — ERYTHROMYCIN 1 G: 5 OINTMENT OPHTHALMIC at 20:13

## 2023-06-11 RX ADMIN — CALCIUM 500 MG: 500 TABLET ORAL at 17:25

## 2023-06-11 RX ADMIN — LIDOCAINE PATCH 4% 1 PATCH: 40 PATCH TOPICAL at 22:57

## 2023-06-11 RX ADMIN — CEPHALEXIN 250 MG: 250 CAPSULE ORAL at 16:23

## 2023-06-11 ASSESSMENT — ACTIVITIES OF DAILY LIVING (ADL)
ADLS_ACUITY_SCORE: 47
ADLS_ACUITY_SCORE: 43
ADLS_ACUITY_SCORE: 47
ADLS_ACUITY_SCORE: 43
ADLS_ACUITY_SCORE: 43
ADLS_ACUITY_SCORE: 48
ADLS_ACUITY_SCORE: 43
ADLS_ACUITY_SCORE: 47
ADLS_ACUITY_SCORE: 43
ADLS_ACUITY_SCORE: 48
ADLS_ACUITY_SCORE: 43
ADLS_ACUITY_SCORE: 43

## 2023-06-11 NOTE — PROVIDER NOTIFICATION
Trauma paged:    pt requests medication for facial itching around fracture site. She also states that she takes atenolol in the AM and would like it ordered. Would also like losartan rescheduled to HS. thanks

## 2023-06-11 NOTE — PROGRESS NOTES
06/11/23 0825   Appointment Info   Signing Clinician's Name / Credentials (PT) Dolores Bassett, PT, DPT   Living Environment   People in Home alone   Current Living Arrangements house   Home Accessibility no concerns   Living Environment Comments Single level home, tub shower, has shower chair but doesn't use   Self-Care   Usual Activity Tolerance good   Current Activity Tolerance moderate   Regular Exercise No   Equipment Currently Used at Home none   Fall history within last six months yes  (1- reason for admit)   Number of times patient has fallen within last six months 1   Activity/Exercise/Self-Care Comment IND with mobility and ADLs, does not own walker. Has a small dog she takes care of   General Information   Onset of Illness/Injury or Date of Surgery 06/10/23   Referring Physician Julita Ulloa MD   Patient/Family Therapy Goals Statement (PT) Return home   Pertinent History of Current Problem (include personal factors and/or comorbidities that impact the POC) 78 year old female with metastatic breast cancer (skeletal and abdominal metastases) who presents as a transfer from [facility] after a fall around 1430 on 06/09. She was walking to get the mail when she fell and hit her head. Did not loose consciousness. Was transported via EMS to [facility]. Workup there demonstrated: scattered multifocal SAH,  left orbital floor blowout fracture,  left periorbital ecchymosis and left maxillary sinus hemorrhage   Existing Precautions/Restrictions fall  (sinus)   General Observations Activity: Up with assist   Cognition   Cognitive Status Comments Oriented to self, date and location   Integumentary/Edema   Integumentary/Edema Comments L facial and inferior chin eccymosis   Posture    Posture Protracted shoulders   Range of Motion (ROM)   Range of Motion ROM is WFL   Strength (Manual Muscle Testing)   Strength (Manual Muscle Testing) strength is WFL   Bed Mobility   Bed Mobility supine-sit   Supine-Sit [place] (Bed  Mobility) minimum assist (75% patient effort)   Comment, (Bed Mobility) SLow speed, limited by pain   Transfers   Transfers sit-stand transfer   Sit-Stand Transfer   Sit-Stand [place] (Transfers) minimum assist (75% patient effort)   Comment, (Sit-Stand Transfer) On initial stand, min A required for force production. Pt demonstrated LE instability without AD   Gait/Stairs (Locomotion)   [place] Level (Gait) contact guard   Assistive Device (Gait) walker, front-wheeled   Distance in Feet 10   Distance in Feet (Gait) 40   Comment, (Gait/Stairs) Initially, trialed stand and gait without AD, pt with poor stability, pt reaching for railing to support, knee bouncing. With walker, improved stability, short step length   Balance   Balance Comments Sitting EOB: SBA, Standing: Required B UE support   Sensory Examination   Sensory Perception patient reports no sensory changes   Clinical Impression   Criteria for Skilled Therapeutic Intervention Yes, treatment indicated   PT Diagnosis (PT) impaired mobility   Influenced by the following impairments pain, activity tolerance   Functional limitations due to impairments bed mobility, transfers, gait   Clinical Presentation (PT Evaluation Complexity) Evolving/Changing   Clinical Presentation Rationale clinical judgement   Clinical Decision Making (Complexity) moderate complexity   Planned Therapy Interventions (PT) balance training;bed mobility training;gait training;home exercise program;neuromuscular re-education;strengthening;transfer training;progressive activity/exercise   Anticipated Equipment Needs at Discharge (PT) walker, rolling   Risk & Benefits of therapy have been explained evaluation/treatment results reviewed;care plan/treatment goals reviewed;risks/benefits reviewed;current/potential barriers reviewed;participants voiced agreement with care plan;participants included;patient   PT Total Evaluation Time   PT Eval, Moderate Complexity Minutes (01871) 10    Physical Therapy Goals   PT Frequency 5x/week   PT Predicted Duration/Target Date for Goal Attainment 07/14/23   PT Goals Bed Mobility;Transfers;Gait   PT: Bed Mobility Independent;Within precautions   PT: Transfers Modified independent;Within precautions   PT: Gait Modified independent;100 feet   Interventions   Interventions Quick Adds Gait Training;Therapeutic Activity   Therapeutic Activity   Therapeutic Activities: dynamic activities to improve functional performance Minutes (79556) 15   Treatment Detail/Skilled Intervention Pt supine upon arrival, agreeable to therapy session, RN ok'd mobility. To progress functional independence, pt completed transfers. Edu on hand placement when using walker, progressed to completing with CGA, required multiple attempts before success. Discussion held on discharge recs and reasoning, pt participatory. BP monitored, in sitting 166/86. Edu on sinus precautions   Gait Training   Gait Training Minutes (81522) 8   Treatment Detail/Skilled Intervention To progress gait, pt ambulated with FWW and CGA. Upon initial stand, pregait completed in cluding lateral weight shifts, progressing to pivot to recliner. Short step length, progressed with duration. Progressed to gait in hallway. Pt denied dizziness but reported worsened head ache following walk. Mobility goals established for acute setting. Pt upright in chair at end of session, call light in reach, denied further needs.   [place] Level (Gait Training) contact guard   PT Discharge Planning   PT Plan Progress gait (assess need for AD), bed mobility   PT Discharge Recommendation (DC Rec) Transitional Care Facility   PT Rationale for DC Rec Pt currently requiring Ax1 for all upright mobility, pt lives alone at baseline. Recommend TCU to progress functional independence to level that pt can manage at home.   PT Brief overview of current status Ax1 with FWW, walk in olivares 3x/day   Total Session Time   Timed Code Treatment Minutes  23   Total Session Time (sum of timed and untimed services) 33

## 2023-06-11 NOTE — PROGRESS NOTES
[facility]  Trauma Service Progress Note    Date of Service (when I saw the patient): 06/11/2023     Assessment & Plan   Trauma Mechanism: GLF  Time/date of injury: 1430 on 06/09  Known Injuries:  1. SAH (scattered, small volume, most pronounced along bilateral sylvian fissures)  2. Left periorbital hematoma  3. Left orbital floor blowout fracture with small volume hemorrhage along orbital floor  4. Left maxillary sinus hemorrhage      Neuro/Pain/Psych:  # Traumatic SAH   # TBI: nausea, vomiting, headache   # Hx of migraine   - started prn: zofran, compazine, meclizine,   - Follow-up Head CT 06/10 @ Evolving scattered subarachnoid hemorrhage. Question small focus of intraparenchymal hemorrhage at the frontotemporal junction measuring 0.5 cm versus increased subarachnoid hemorrhage.   - Keppra x 7 days,   - No Aspirin  - no follow-up required      # Acute traumtic pain   - Scheduled: Tylenol, robaxin   - PRN: oxycodone 2.5-5  - Maintain circadian rhythm.  Lights on during the day.  Off at night, minimize cares at night.  OOB during the day.     EENT:  # Left periorbital hematoma  # Left orbital floor blowout fracture with small volume hemorrhage along orbital floor  # Left maxillary sinus hemorrhage   # s/p right eye cataract lab 10/25/22, left cataract removal 11/15/2022  - Ophthalmology plan:  No emergent surgery indicated  Medrol dose pack   Keflex 500 mg PO QID x 7 days then stop,   Erythromycin ophthalmic ointment TID to left eye until follow up in clinic,   Afrin BID PRN x 3 days, ice packs 20 min q1-2 hours x 24 hours.   Keep upright as much as able to help with swelling  Nasal precautions: no nose blowing, sneeze with mouth open, no heavy listing or straws  Follow up in occuloplastics clinic in 1-2 weeks - patient will be called to arrange follow up     Pulmonary:  Routine pulm hygience     Cardiovascular:    # Hypertension   - Monitor hemodynamic status.   -  continue PTA: Atenolol 25, losartan, verapamil 180, triamterene -hctz 37.5-25 (held due to hyponatremia and SHAYAN, resume with improvement)     GI/Nutrition:    - Regular diet     Renal/ Fluids/Electrolytes:  # CKD3  # Acute Hyponatremia, resolved with IVF  # Mild hypomagnesemia  # Incidental finding Right kidney lesion,  - Thoracic spine CT: Incompletely imaged cystic lesion involving the right kidney measuring up to 7.1 cm. Recommend follow-up nonemergent renal ultrasound for further evaluation.  - continue PTA: magnesium gluconate  - electrolyte replacement protocol in place.      Endocrine:  # Stress and steroid induced hyperglycemia  Monitor twice daily if >180g/dL may require short term hyperglycemia agent for control  - No management indication.      Infectious disease:   Prophylactic Keflex x7 days 2/2 facial bone fractures     Hematology/Oncology:    # Chemotherapy induced pancytopenia   - Hgb 10.4. --> 8.4g/dL, some blood loss anticipated with traumatic injuries, platelet dysfunction, metastatic illness and recent chemotherapy.  - Recheck Hgb @ 10am  - Threshold for transfusion if hgb <7.0 or signs/symptoms of hypoperfusion.     - continue PTA b12, ferrous sulfate, folic acid     # Platelet dysfunction 2/2 ASA  - Hold PTA ASA secondary to subarachnoid hemorrhage     # Stage 4 metastatic Breast cancer   # Diffuse osseous metastatic disease   - PTA Ibrance, 21 days on, 7 days off. Next dose 6/12  - Continue PTA: Oscal, Vit D      Musculoskeletal:  # s/p left THR  # Weakness and deconditioning of chronic illness   # Right knee pain   - Physical and occupational therapy consults.     Skin:  # Ecchymosis   - dilgent cares to prevent skin breakdown and wound formation.       Lines/ tubes/ drains:  - PIV      General Cares:                 PPI/H2 blocker:  NA                DVT prophylaxis: pcd insetting of acute ICH                Bowel Regimen/Date of last stool: PTA, ordered                Pulmonary toilet:  "cough     Code status:  Full Code      Discharge goals:     Adequate pain management: in process    VSS x24 hours: NA    Hemoglobin stable x 48 hours: NA    Ambulating safely and/or therapy evals complete: in process    Drains/lines removed or plan in place to manage: yes    Teaching done:     Other:  Expected D/C date: patient is ready for discharge, repeat hemoglobin stable, PT recommending TCU however pt prefers to return home due to recent chemo. Will discuss with family and have SW/CC followup with patient.  Interval History   Teary today states, \" I cant believe this happened to me, I should have been more carefull\", reports facial pain, nausea is improved  ROS x 8 negative with exception of those things listed in interval hx    Physical Exam   Temp: 97.3  F (36.3  C) Temp src: Oral BP: (!) 144/61 Pulse: 80   Resp: 16 SpO2: 94 % O2 Device: Nasal cannula Oxygen Delivery: 2 LPM  There were no vitals filed for this visit.  Vital Signs with Ranges  Temp:  [97.3  F (36.3  C)-97.8  F (36.6  C)] 97.3  F (36.3  C)  Pulse:  [63-80] 80  Resp:  [13-16] 16  BP: (124-144)/(53-71) 144/61  SpO2:  [90 %-100 %] 94 %  I/O last 3 completed shifts:  In: 1174.99 [I.V.:1174.99]  Out: 700 [Urine:700]    Stephani Coma Scale - Total 15/15  Eye Response (E): 4   4= spontaneous, 3= to verbal/voice, 2= to pain, 1= No response   Verbal Response (V): 5   5= Orientated, converses, 4= Confused, converses, 3= Inappropriate words, 2= Incomprehensible sounds, 1=No response   Motor Response (M): 6   6= Obeys commands, 5= Localizes to pain, 4= Withdrawal to pain, 3=Fexion to pain, 2= Extension to pain, 1= No response     Constitutional: Awake, alert, cooperative, no apparent distress.  Eyes: moderate left periorbital/facial, chin and neck ecchymosis and edema  ENT: Normocephalic  Respiratory: No increased work of breathing, good air exchange, clear to auscultation bilaterally, no crackles or wheezing.  Cardiovascular:  regular rate and rhythm, " normal S1 and S2  GI: Normal bowel sounds, abdomen soft, non-distended, non-tender, no guarding  Genitourinary:  Voids spont  Skin:  Warm and dry except for ecchymosis   Musculoskeletal: There is no redness, warmth, or swelling of the joints.  Pedal pulse palpated.  Neurologic: Awake, alert, oriented. Cranial nerves II-XII are grossly intact.  Strength and sensory is intact. No focal deficits.  Neuropsychiatric: Calm, normal eye contact, alert, affect appropriate to situation, oriented, thought process normal.    BITA Jean CNP  To contact the trauma service use job code pager [phone],   Numeric texts or alpha text through [facility]

## 2023-06-11 NOTE — PLAN OF CARE
Assumed cares from [id number].    Reason for admission: 6/10 fall at home with subarachnoid hemorrhage, orbital fracture, L shoulder fracture, hematoma on L head    VS: hypertensive, otherwise stable. On PTA atenolol and losartan  Pain/Nausea: from injury, mostly on L side head and jaw, given scheduled tylenol and methocarbamol, PRN oxycodone 5 mg with moderate relief. Denies nausea.   Neuro: A&Ox4, generalized weakness and movements slow due to pain.   Cardiac: WDL  Resp: diminished throughout, intermittent on 2 lpm via NC to room air.   GI/: +BS, passing flatus, LBM 6/9, voids spontaneously via bedside commode.   Skin: bruising and hematoma with edema on L face, neck, head, and shoulder. L eye mostly closed due to edema.   Diet: poor intake of regular diet    Activity: x1 GB  LDA: R PIV SL, L PIV SL  Labs: reviewed    Plan: continue POC.     Goal Outcome Evaluation:      Plan of Care Reviewed With: patient    Overall Patient Progress: no changeOverall Patient Progress: no change    Outcome Evaluation: pain minimally controlled, fatigued, up in chair

## 2023-06-12 ENCOUNTER — APPOINTMENT (OUTPATIENT)
Dept: PHYSICAL THERAPY | Facility: CLINIC | Age: 78
DRG: 124 | End: 2023-06-12
Payer: MEDICARE

## 2023-06-12 ENCOUNTER — TELEPHONE (OUTPATIENT)
Dept: OPHTHALMOLOGY | Facility: CLINIC | Age: 78
End: 2023-06-12
Payer: MEDICARE

## 2023-06-12 ENCOUNTER — APPOINTMENT (OUTPATIENT)
Dept: OCCUPATIONAL THERAPY | Facility: CLINIC | Age: 78
DRG: 124 | End: 2023-06-12
Attending: STUDENT IN AN ORGANIZED HEALTH CARE EDUCATION/TRAINING PROGRAM
Payer: MEDICARE

## 2023-06-12 LAB
ANION GAP SERPL CALCULATED.3IONS-SCNC: 11 MMOL/L (ref 7–15)
ATRIAL RATE - MUSE: 61 BPM
BUN SERPL-MCNC: 14.5 MG/DL (ref 8–23)
CALCIUM SERPL-MCNC: 10 MG/DL (ref 8.8–10.2)
CHLORIDE SERPL-SCNC: 106 MMOL/L (ref 98–107)
CREAT SERPL-MCNC: 1 MG/DL (ref 0.51–0.95)
DEPRECATED HCO3 PLAS-SCNC: 23 MMOL/L (ref 22–29)
DIASTOLIC BLOOD PRESSURE - MUSE: NORMAL MMHG
GFR SERPL CREATININE-BSD FRML MDRD: 58 ML/MIN/1.73M2
GLUCOSE BLDC GLUCOMTR-MCNC: 117 MG/DL (ref 70–99)
GLUCOSE BLDC GLUCOMTR-MCNC: 126 MG/DL (ref 70–99)
GLUCOSE SERPL-MCNC: 143 MG/DL (ref 70–99)
HOLD SPECIMEN: NORMAL
INTERPRETATION ECG - MUSE: NORMAL
MAGNESIUM SERPL-MCNC: 2.2 MG/DL (ref 1.7–2.3)
MAGNESIUM SERPL-MCNC: 2.6 MG/DL (ref 1.7–2.3)
P AXIS - MUSE: 77 DEGREES
POTASSIUM SERPL-SCNC: 3.9 MMOL/L (ref 3.4–5.3)
POTASSIUM SERPL-SCNC: 4 MMOL/L (ref 3.4–5.3)
PR INTERVAL - MUSE: 206 MS
QRS DURATION - MUSE: 140 MS
QT - MUSE: 416 MS
QTC - MUSE: 418 MS
R AXIS - MUSE: -35 DEGREES
SARS-COV-2 RNA RESP QL NAA+PROBE: NEGATIVE
SODIUM SERPL-SCNC: 140 MMOL/L (ref 136–145)
SYSTOLIC BLOOD PRESSURE - MUSE: NORMAL MMHG
T AXIS - MUSE: 5 DEGREES
VENTRICULAR RATE- MUSE: 61 BPM

## 2023-06-12 PROCEDURE — 97530 THERAPEUTIC ACTIVITIES: CPT | Mod: GO

## 2023-06-12 PROCEDURE — 250N000013 HC RX MED GY IP 250 OP 250 PS 637: Performed by: SURGERY

## 2023-06-12 PROCEDURE — 250N000009 HC RX 250: Performed by: NURSE PRACTITIONER

## 2023-06-12 PROCEDURE — 250N000013 HC RX MED GY IP 250 OP 250 PS 637: Performed by: NURSE PRACTITIONER

## 2023-06-12 PROCEDURE — 80048 BASIC METABOLIC PNL TOTAL CA: CPT | Performed by: NURSE PRACTITIONER

## 2023-06-12 PROCEDURE — 99232 SBSQ HOSP IP/OBS MODERATE 35: CPT | Performed by: NURSE PRACTITIONER

## 2023-06-12 PROCEDURE — 36415 COLL VENOUS BLD VENIPUNCTURE: CPT | Performed by: SURGERY

## 2023-06-12 PROCEDURE — 97116 GAIT TRAINING THERAPY: CPT | Mod: GP

## 2023-06-12 PROCEDURE — 250N000013 HC RX MED GY IP 250 OP 250 PS 637: Performed by: PHYSICIAN ASSISTANT

## 2023-06-12 PROCEDURE — 83735 ASSAY OF MAGNESIUM: CPT | Performed by: SURGERY

## 2023-06-12 PROCEDURE — 250N000011 HC RX IP 250 OP 636: Performed by: NURSE PRACTITIONER

## 2023-06-12 PROCEDURE — 97535 SELF CARE MNGMENT TRAINING: CPT | Mod: GO

## 2023-06-12 PROCEDURE — 120N000002 HC R&B MED SURG/OB UMMC

## 2023-06-12 PROCEDURE — 250N000013 HC RX MED GY IP 250 OP 250 PS 637: Performed by: STUDENT IN AN ORGANIZED HEALTH CARE EDUCATION/TRAINING PROGRAM

## 2023-06-12 PROCEDURE — 97530 THERAPEUTIC ACTIVITIES: CPT | Mod: GP

## 2023-06-12 PROCEDURE — 250N000009 HC RX 250: Performed by: PHYSICIAN ASSISTANT

## 2023-06-12 PROCEDURE — 84132 ASSAY OF SERUM POTASSIUM: CPT | Performed by: SURGERY

## 2023-06-12 PROCEDURE — 250N000012 HC RX MED GY IP 250 OP 636 PS 637: Performed by: STUDENT IN AN ORGANIZED HEALTH CARE EDUCATION/TRAINING PROGRAM

## 2023-06-12 PROCEDURE — 97165 OT EVAL LOW COMPLEX 30 MIN: CPT | Mod: GO

## 2023-06-12 PROCEDURE — 87635 SARS-COV-2 COVID-19 AMP PRB: CPT | Performed by: SURGERY

## 2023-06-12 RX ORDER — AMOXICILLIN 250 MG
2 CAPSULE ORAL 2 TIMES DAILY
Qty: 12 TABLET | Refills: 0 | Status: SHIPPED | OUTPATIENT
Start: 2023-06-12

## 2023-06-12 RX ORDER — LIDOCAINE 4 G/G
1 PATCH TOPICAL EVERY 24 HOURS
Qty: 10 PATCH | Refills: 0 | Status: SHIPPED | OUTPATIENT
Start: 2023-06-12

## 2023-06-12 RX ORDER — CEPHALEXIN 500 MG/1
500 CAPSULE ORAL 4 TIMES DAILY
Status: DISCONTINUED | OUTPATIENT
Start: 2023-06-12 | End: 2023-06-13 | Stop reason: HOSPADM

## 2023-06-12 RX ORDER — LEVETIRACETAM 750 MG/1
750 TABLET ORAL 2 TIMES DAILY
Qty: 10 TABLET | Refills: 0 | Status: SHIPPED | OUTPATIENT
Start: 2023-06-12 | End: 2023-06-17

## 2023-06-12 RX ORDER — METHOCARBAMOL 500 MG/1
500 TABLET, FILM COATED ORAL 4 TIMES DAILY
Qty: 28 TABLET | Refills: 0 | Status: SHIPPED | OUTPATIENT
Start: 2023-06-12 | End: 2023-06-19

## 2023-06-12 RX ORDER — TRIAMTERENE/HYDROCHLOROTHIAZID 37.5-25 MG
1 TABLET ORAL DAILY
Status: DISCONTINUED | OUTPATIENT
Start: 2023-06-12 | End: 2023-06-13 | Stop reason: HOSPADM

## 2023-06-12 RX ORDER — CEPHALEXIN 500 MG/1
500 CAPSULE ORAL 4 TIMES DAILY
Qty: 20 CAPSULE | Refills: 0 | Status: SHIPPED | OUTPATIENT
Start: 2023-06-12 | End: 2023-06-17

## 2023-06-12 RX ORDER — LOSARTAN POTASSIUM 50 MG/1
50 TABLET ORAL 2 TIMES DAILY
Status: DISCONTINUED | OUTPATIENT
Start: 2023-06-12 | End: 2023-06-12

## 2023-06-12 RX ORDER — ERYTHROMYCIN 5 MG/G
OINTMENT OPHTHALMIC 3 TIMES DAILY
Qty: 3.5 G | Refills: 0 | Status: SHIPPED | OUTPATIENT
Start: 2023-06-12 | End: 2023-06-19

## 2023-06-12 RX ORDER — LOSARTAN POTASSIUM 50 MG/1
50 TABLET ORAL AT BEDTIME
Status: DISCONTINUED | OUTPATIENT
Start: 2023-06-12 | End: 2023-06-13 | Stop reason: HOSPADM

## 2023-06-12 RX ORDER — OXYCODONE HYDROCHLORIDE 5 MG/1
5 TABLET ORAL EVERY 8 HOURS PRN
Qty: 16 TABLET | Refills: 0 | Status: SHIPPED | OUTPATIENT
Start: 2023-06-12 | End: 2023-06-17

## 2023-06-12 RX ORDER — ACETAMINOPHEN 325 MG/1
975 TABLET ORAL 3 TIMES DAILY
Qty: 30 TABLET | Refills: 0 | Status: SHIPPED | OUTPATIENT
Start: 2023-06-12

## 2023-06-12 RX ADMIN — ACETAMINOPHEN 975 MG: 325 TABLET, FILM COATED ORAL at 14:29

## 2023-06-12 RX ADMIN — OXYMETAZOLINE HYDROCHLORIDE 2 SPRAY: 0.05 SPRAY NASAL at 22:58

## 2023-06-12 RX ADMIN — METHYLPREDNISOLONE 4 MG: 4 TABLET ORAL at 22:58

## 2023-06-12 RX ADMIN — OXYCODONE HYDROCHLORIDE 5 MG: 5 TABLET ORAL at 19:40

## 2023-06-12 RX ADMIN — ERYTHROMYCIN 1 G: 5 OINTMENT OPHTHALMIC at 22:58

## 2023-06-12 RX ADMIN — CEPHALEXIN 250 MG: 250 CAPSULE ORAL at 08:21

## 2023-06-12 RX ADMIN — Medication 800 MCG: at 08:19

## 2023-06-12 RX ADMIN — ERYTHROMYCIN 1 G: 5 OINTMENT OPHTHALMIC at 10:28

## 2023-06-12 RX ADMIN — ATENOLOL 25 MG: 25 TABLET ORAL at 08:19

## 2023-06-12 RX ADMIN — VERAPAMIL HYDROCHLORIDE 180 MG: 180 TABLET, FILM COATED, EXTENDED RELEASE ORAL at 23:06

## 2023-06-12 RX ADMIN — FERROUS SULFATE TAB 325 MG (65 MG ELEMENTAL FE) 325 MG: 325 (65 FE) TAB at 19:40

## 2023-06-12 RX ADMIN — OXYCODONE HYDROCHLORIDE 2.5 MG: 5 TABLET ORAL at 11:11

## 2023-06-12 RX ADMIN — METHYLPREDNISOLONE 4 MG: 4 TABLET ORAL at 18:03

## 2023-06-12 RX ADMIN — METHYLPREDNISOLONE 4 MG: 4 TABLET ORAL at 12:45

## 2023-06-12 RX ADMIN — ERYTHROMYCIN 1 G: 5 OINTMENT OPHTHALMIC at 14:29

## 2023-06-12 RX ADMIN — LEVETIRACETAM 750 MG: 750 TABLET, FILM COATED ORAL at 19:39

## 2023-06-12 RX ADMIN — FERROUS SULFATE TAB 325 MG (65 MG ELEMENTAL FE) 325 MG: 325 (65 FE) TAB at 08:21

## 2023-06-12 RX ADMIN — Medication 25 MCG: at 08:21

## 2023-06-12 RX ADMIN — METHOCARBAMOL 500 MG: 500 TABLET ORAL at 08:19

## 2023-06-12 RX ADMIN — CEPHALEXIN 500 MG: 500 CAPSULE ORAL at 16:34

## 2023-06-12 RX ADMIN — SENNOSIDES AND DOCUSATE SODIUM 2 TABLET: 50; 8.6 TABLET ORAL at 19:40

## 2023-06-12 RX ADMIN — METHOCARBAMOL 500 MG: 500 TABLET ORAL at 19:40

## 2023-06-12 RX ADMIN — POLYETHYLENE GLYCOL 3350 17 G: 17 POWDER, FOR SOLUTION ORAL at 19:40

## 2023-06-12 RX ADMIN — HYDRALAZINE HYDROCHLORIDE 10 MG: 20 INJECTION INTRAMUSCULAR; INTRAVENOUS at 11:12

## 2023-06-12 RX ADMIN — CYANOCOBALAMIN TAB 1000 MCG 1000 MCG: 1000 TAB at 08:19

## 2023-06-12 RX ADMIN — CEPHALEXIN 500 MG: 500 CAPSULE ORAL at 19:40

## 2023-06-12 RX ADMIN — CEPHALEXIN 500 MG: 500 CAPSULE ORAL at 12:45

## 2023-06-12 RX ADMIN — CALCIUM 500 MG: 500 TABLET ORAL at 18:03

## 2023-06-12 RX ADMIN — LIDOCAINE PATCH 4% 1 PATCH: 40 PATCH TOPICAL at 19:40

## 2023-06-12 RX ADMIN — ACETAMINOPHEN 975 MG: 325 TABLET, FILM COATED ORAL at 19:39

## 2023-06-12 RX ADMIN — METHYLPREDNISOLONE 4 MG: 4 TABLET ORAL at 08:19

## 2023-06-12 RX ADMIN — Medication 500 MG: at 08:19

## 2023-06-12 RX ADMIN — LOSARTAN POTASSIUM 50 MG: 50 TABLET, FILM COATED ORAL at 22:58

## 2023-06-12 RX ADMIN — OXYMETAZOLINE HYDROCHLORIDE 2 SPRAY: 0.05 SPRAY NASAL at 08:22

## 2023-06-12 RX ADMIN — TRIAMTERENE AND HYDROCHLOROTHIAZIDE 1 TABLET: 37.5; 25 TABLET ORAL at 10:28

## 2023-06-12 RX ADMIN — SENNOSIDES AND DOCUSATE SODIUM 2 TABLET: 50; 8.6 TABLET ORAL at 08:19

## 2023-06-12 RX ADMIN — LEVETIRACETAM 750 MG: 750 TABLET, FILM COATED ORAL at 08:19

## 2023-06-12 RX ADMIN — CALCIUM 500 MG: 500 TABLET ORAL at 08:21

## 2023-06-12 RX ADMIN — Medication 800 MCG: at 19:43

## 2023-06-12 RX ADMIN — METHOCARBAMOL 500 MG: 500 TABLET ORAL at 12:45

## 2023-06-12 RX ADMIN — ACETAMINOPHEN 975 MG: 325 TABLET, FILM COATED ORAL at 08:21

## 2023-06-12 RX ADMIN — METHOCARBAMOL 500 MG: 500 TABLET ORAL at 16:34

## 2023-06-12 RX ADMIN — OXYCODONE HYDROCHLORIDE AND ACETAMINOPHEN 500 MG: 500 TABLET ORAL at 22:58

## 2023-06-12 ASSESSMENT — ACTIVITIES OF DAILY LIVING (ADL)
WEAR_GLASSES_OR_BLIND: NO
DIFFICULTY_EATING/SWALLOWING: NO
DRESSING/BATHING_DIFFICULTY: NO
ADLS_ACUITY_SCORE: 26
ADLS_ACUITY_SCORE: 22
ADLS_ACUITY_SCORE: 26
PREVIOUS_RESPONSIBILITIES: MEAL PREP;HOUSEKEEPING;LAUNDRY;MEDICATION MANAGEMENT;FINANCES
ADLS_ACUITY_SCORE: 26
ADLS_ACUITY_SCORE: 22
CHANGE_IN_FUNCTIONAL_STATUS_SINCE_ONSET_OF_CURRENT_ILLNESS/INJURY: YES
FALL_HISTORY_WITHIN_LAST_SIX_MONTHS: YES
ADLS_ACUITY_SCORE: 22
NUMBER_OF_TIMES_PATIENT_HAS_FALLEN_WITHIN_LAST_SIX_MONTHS: 1
CONCENTRATING,_REMEMBERING_OR_MAKING_DECISIONS_DIFFICULTY: NO
ADLS_ACUITY_SCORE: 22
WALKING_OR_CLIMBING_STAIRS_DIFFICULTY: NO
DOING_ERRANDS_INDEPENDENTLY_DIFFICULTY: NO
TOILETING_ISSUES: NO
ADLS_ACUITY_SCORE: 22
ADLS_ACUITY_SCORE: 43
ADLS_ACUITY_SCORE: 26
DEPENDENT_IADLS:: SHOPPING
ADLS_ACUITY_SCORE: 22
ADLS_ACUITY_SCORE: 43

## 2023-06-12 NOTE — PLAN OF CARE
Physical Therapy Discharge Summary    Reason for therapy discharge:    Discharged from inpatient PT, all goals met. Pt ambulating modified independent with FWW and recommendation for home health PT to reduce fall risk and deconditioning. Order placed for provider signature for FWW at discharge.     Progress towards therapy goal(s). See goals on Care Plan in [facility] electronic health record for goal details.  Goals met    Therapy recommendation(s):    Continued therapy is recommended.  Rationale/Recommendations:  To progress to prior level of function, improve balance and strength.

## 2023-06-12 NOTE — PLAN OF CARE
Nursing Focus: Admission    D: Patient admitted/transferred from  via transport in wheelchair for mechanical fall with a subarachnoid hemorrhage and orbital fracture.  She is sent here with concern for her orbital fracture.    I: Upon arrival to the unit patient was oriented to room, unit, and call light. Patient s height, weight, and vital signs were obtained. Allergies reviewed and allergy band applied. MD notified of patient s arrival on the unit. Adult AVS completed. Head to toe assessment completed. Education assessment completed. Care plan initiated.     A: Vital signs stable upon admission. Patient rates pain at 3/10 Two RN skin assessment completed Yes. Second RN was Fouzia SOMMERS. Significant Skin Findings include pt has bruising under left side breast/chest, overall face/shin are little swollen/bruise and scaps on left/right knee and overall generalized bu\bruising. [facility] Nurse Consult Ordered No. Bed Algorithm can be found in PCS flow sheets (Support Surface Algorithm) and on IP [facility] NURSE RESOURCE TAB, was this used during this assessment? Yes. Was a pulsate mattress ordered No.     P: Continue to monitor patient s neuro's and pain and intervene as needed. Continue with plan of care. Notify MD with any concerns or changes in patient status.

## 2023-06-12 NOTE — PHARMACY-ADMISSION MEDICATION HISTORY
Pharmacist Admission Medication History    Admission medication history completed on 6/9/23 by pharmacist at [facility] prior to transfer. See note dated 6/9/23 for details.     Prior to Admission medications    Medication Sig Last Dose Taking? Auth Provider Long Term End Date   Ascorbic Acid (VITAMIN C) 500 MG CAPS Take 500 mg by mouth At Bedtime   Unknown, Entered By History     aspirin 81 MG EC tablet Take 81 mg by mouth daily   Unknown, Entered By History     atenolol (TENORMIN) 25 MG tablet Take 25 mg by mouth daily   Reported, Patient Yes    Biotin 17104 MCG TABS Take 1 tablet by mouth 2 times daily   Unknown, Entered By History     BLACK ELDERBERRY PO Take 100 mg by mouth daily   Unknown, Entered By History     cholecalciferol (VITAMIN D3) 25 mcg (1000 units) capsule Take 1 capsule by mouth daily   Unknown, Entered By History     co-enzyme Q-10 100 MG CAPS capsule Take 100 mg by mouth daily   Unknown, Entered By History     Cranberry-Cholecalciferol 4200-500 MG-UNIT CAPS Take 1 capsule by mouth daily   Unknown, Entered By History     cyanocobalamin (VITAMIN B-12) 1000 MCG tablet Take 1,000 mcg by mouth daily   Unknown, Entered By History     doxycycline monohydrate (MONODOX) 100 MG capsule Take 100 mg by mouth daily as needed (when on chemo)   Unknown, Entered By History     ferrous sulfate (FEROSUL) 325 (65 Fe) MG tablet Take 325 mg by mouth 2 times daily   Unknown, Entered By History     fish oil-omega-3 fatty acids 1000 MG capsule Take 1 g by mouth every morning   Unknown, Entered By History     Folic Acid (FOLATE PO) Take 800 mcg by mouth 2 times daily   Unknown, Entered By History     Garlic 1000 MG CAPS Take 1 capsule by mouth daily   Unknown, Entered By History     losartan (COZAAR) 50 MG tablet Take 50 mg by mouth daily   Reported, Patient Yes    magnesium 250 MG tablet Take 1 tablet by mouth daily   Unknown, Entered By History     Multiple Vitamins-Minerals (ICAPS AREDS 2 PO) Take 1 capsule by  mouth 2 times daily   Unknown, Entered By History     palbociclib (IBRANCE) 100 MG capsule Take 100 mg by mouth daily with food 21 days on  7 days off   Reported, Patient     Probiotic Product (PROBIOTIC-10 PO) Take 1 tablet by mouth daily   Unknown, Entered By History     triamterene-HCTZ (MAXZIDE-25) 37.5-25 MG tablet Take 1 tablet by mouth daily   Reported, Patient Yes    verapamil ER (CALAN-SR) 180 MG CR tablet Take 180 mg by mouth At Bedtime   Reported, Patient Yes        Shanon Almendarez [facility] 6/12/2023 10:33 AM

## 2023-06-12 NOTE — PROGRESS NOTES
[id number]    V/S: VSS, afebrile. /80, trauma aware, HR 55.  Neuro: Pt is A&O x4,  denies headache & lightheadedness. denies numbness & tingling, calls appropriately. HOB >30 degrees.  Resp: 3L NC. Sats > 90%, Denies SOB. Lung sounds diminished thoughout   Cardiac: NSR w/RBB. denies chest pain & palpitations.  GI/: BG checks BID. regular diet, tolerating well. Denies n/v/d. Voiding aproroately. LBM 6/9  Skin: Generalized bruising throughout left side r/t fall.  Pain: endorses 8/10 pain. PRN oxycodone given with moderate relief, Robaxin and tylenol scheduled   Activity: Up with assist x1  in room & in hallways.  Electrolytes: Mag 1.5, replaced now 2.6, K+ 4.5, recheck in AM  Access: L 18g PIV SL     Will continue to monitor and report changes to team.  Patient currently resting in bed with call light in reach.

## 2023-06-12 NOTE — PROGRESS NOTES
Paged trauma team @ [phone]    FYI, pt admitted from ED and HR sustaining in the 50's and as low as 48bpm while asleep.   - Irene #[id number]

## 2023-06-12 NOTE — PROGRESS NOTES
"   06/12/23 0921   Appointment Info   Signing Clinician's Name / Credentials (OT) Khadijah Keller, OTR/L   Living Environment   People in Home alone   Current Living Arrangements house   Home Accessibility no concerns   Transportation Anticipated family or friend will provide   Living Environment Comments Single level home, tub shower, has shower chair but doesn't use   Self-Care   Usual Activity Tolerance good   Current Activity Tolerance moderate   Regular Exercise No   Equipment Currently Used at Home none   Fall history within last six months yes   Number of times patient has fallen within last six months 1   Activity/Exercise/Self-Care Comment IND with mobility and ADLs, does not own walker. Has a small dog she takes care of   Instrumental Activities of Daily Living (IADL)   Previous Responsibilities meal prep;housekeeping;laundry;medication management;finances   IADL Comments Pt reports IADL IND with above list. Sister completes shopping for pt, and family transports. Pt not driving   General Information   Onset of Illness/Injury or Date of Surgery 06/10/23   Referring Physician Julita Ulloa MD   Patient/Family Therapy Goal Statement (OT) to return home   Additional Occupational Profile Info/Pertinent History of Current Problem Per EMR, \"78 year old female with metastatic breast cancer (skeletal and abdominal metastases) who presents as a transfer from [facility] after a fall around 1430 on 06/09. She was walking to get the mail when she fell and hit her head. Did not loose consciousness. Was transported via EMS to [facility]. Workup there demonstrated: scattered multifocal SAH,  left orbital floor blowout fracture,  left periorbital ecchymosis and left maxillary sinus hemorrhage.\"   Existing Precautions/Restrictions fall  (sinus)   Left Upper Extremity (Weight-bearing Status) full weight-bearing (FWB)   Right Upper Extremity (Weight-bearing Status) full weight-bearing (FWB)   Left Lower Extremity (Weight-bearing " Status) full weight-bearing (FWB)   Right Lower Extremity (Weight-bearing Status) full weight-bearing (FWB)   General Observations and Info Activity: up with assist   Cognitive Status Examination   Orientation Status orientation to person, place and time   Visual Perception   Visual Impairment/Limitations corrective lenses for reading  (hx of cataract surgery)   Sensory   Sensory Quick Adds sensation intact   Pain Assessment   Patient Currently in Pain Yes, see Vital Sign flowsheet   Posture   Posture not impaired   Range of Motion Comprehensive   General Range of Motion no range of motion deficits identified   Strength Comprehensive (MMT)   General Manual Muscle Testing (MMT) Assessment no strength deficits identified   Bed Mobility   Comment (Bed Mobility) SBA   Transfers   Transfer Comments SBA with FWW use   Balance   Balance Comments Requiring FWW use for UE support with functional mobility   Activities of Daily Living   BADL Assessment/Intervention bathing;lower body dressing;grooming;toileting   Bathing Assessment/Intervention   Faulk Level (Bathing) contact guard assist   Comment, (Bathing) anticipate per clinical judgement   Lower Body Dressing Assessment/Training   Comment, (Lower Body Dressing) anticipate per clinical judgement   Faulk Level (Lower Body Dressing) contact guard assist   Grooming Assessment/Training   Faulk Level (Grooming) contact guard assist   Comment, (Grooming) anticipate per clinical judgement   Toileting   Comment, (Toileting) anticipate per clinical judgement   Faulk Level (Toileting) contact guard assist   Clinical Impression   Criteria for Skilled Therapeutic Interventions Met (OT) Yes, treatment indicated   OT Diagnosis Decreased ax tolerance and balance completing IADLs   OT Problem List-Impairments impacting ADL problems related to;activity tolerance impaired;balance   Assessment of Occupational Performance 1-3 Performance Deficits   Identified  Performance Deficits bathing, grooming/hygiene, IADLs   Planned Therapy Interventions (OT) ADL retraining;IADL retraining;progressive activity/exercise;home program guidelines;transfer training   Clinical Decision Making Complexity (OT) low complexity   Anticipated Equipment Needs Upon Discharge (OT)   (TBD)   Risk & Benefits of therapy have been explained evaluation/treatment results reviewed;care plan/treatment goals reviewed;participants included;patient   Clinical Impression Comments Pt is appropriate for continued skilled IP OT to maximize safety, balance, and IND completing heavy ADL/IADL prior to return home to prevent high risk for falls   OT Goals   Therapy Frequency (OT) 4 times/wk   OT Predicted Duration/Target Date for Goal Attainment 06/16/23   OT Goals Hygiene/Grooming;Lower Body Dressing;Upper Body Bathing;Lower Body Bathing;Toilet Transfer/Toileting;Home Management   OT: Hygiene/Grooming independent   OT: Lower Body Dressing Independent   OT: Upper Body Bathing Modified independent;using adaptive equipment   OT: Lower Body Bathing Modified independent;using adaptive equipment   OT: Toilet Transfer/Toileting Independent   OT: Home Management Modified independent   Self-Care/Home Management   Self-Care/Home Mgmt/ADL, Compensatory, Meal Prep Minutes (83920) 10   Therapeutic Activities   Therapeutic Activity Minutes (26651) 13   OT Discharge Planning   OT Plan OT: shower task, functional reach/BUE HEP   OT Discharge Recommendation (DC Rec) home with assist;home with home care occupational therapy   OT Rationale for DC Rec Pt performing ADLs near functional baseline with use of FWW, typically perofrming without AD/AE. Anticipate with continuied IP OT pt will be safe to didscharge home with assist from family and HH OT to maximize safety and IND completin heavy IADLs in home.   OT Brief overview of current status SBA/CGA w/FWW   Total Session Time   Timed Code Treatment Minutes 23   Total Session Time (sum  of timed and untimed services) 23

## 2023-06-12 NOTE — TELEPHONE ENCOUNTER
Spoke with patient's daughter, Janiya, at patient's request for scheduling with Oculoplastics clinic in 1-2 weeks for: Orbital fracture -Per Dr. Crockett. Patient was scheduled as offered from [facility] and patient will see appointment in Discharge paperwork. Provided appointment details  over the phone.-Per Patient's Daughter, Janiya

## 2023-06-12 NOTE — PLAN OF CARE
"Goal Outcome Evaluation:    BP (!) 154/63 (BP Location: Right arm)   Pulse 75   Temp 98.5  F (36.9  C) (Oral)   Resp 16   Ht 1.613 m (5' 3.5\")   Wt 87.6 kg (193 lb 3.2 oz)   SpO2 95%   BMI 33.69 kg/m      [id number]    Patient VSS on RA, A & O x 4, q8 neuro checks, BS-126, no BM this shift, nasal precautions-no nose blowing, sneezing with mouth open, no straws, head of bed greater than 30 degrees, assist of 1 with gait belt and walker, up to bedside commode.    .Vane Brooks RN on 6/12/2023 at 2:54 PM                          "

## 2023-06-12 NOTE — PROGRESS NOTES
"Shift: 2482-0069  BP (!) 170/77 (BP Location: Left arm, Patient Position: Semi-Pickard's, Cuff Size: Adult Regular)   Pulse 86   Temp 98.3  F (36.8  C) (Oral)   Resp 16   Ht 1.613 m (5' 3.5\")   Wt (!) 194.5 kg (428 lb 12.7 oz)   SpO2 92%   BMI 74.77 kg/m      A&Ox4. Hypertensive, neurology paged, OVSS on room air. Up with assist x1 and gb/walker, pivoted to commode with gb. Regular diet, poor appetite r/t pain, denies nausea. RN managed mag/potassium, Mag 1.5, waiting for med from pharmacy, K+ 4.5 and recheck in AM. Generalized bruising throughout left side r/t fall. C/o constant pain 10/10, PRN oxycodone given x1, scheduled robaxin and tylenol. (L) PIV saline locked.   "

## 2023-06-12 NOTE — PLAN OF CARE
Admission          6/10/2023  1:22 AM  -----------------------------------------------------------  Reason for admission: Subarachnoid hemorrhage, new O2 requirement and chest pain.  Primary team notified of pt arrival.  Admitted from: ED  Via: stretcher  Belongings: Placed in closet.  Admission Profile: complete  Teaching: orientation to unit and call light- call light within reach, call don't fall, use of console, meal times, when to call for the RN, and enforced importance of safety   Access: PIV  Telemetry:Placed on pt  Ht./Wt.: complete  Code Status verified on armband: yes  2 RN Skin Assessment Completed By: Irene RN and Tonya RN  Med Rec completed: yes  Suction/Ambu bag/Flowmeter at bedside: yes  Is patient having diarrhea upon admission- if YES fill out testing algorithm : no    C. Diff Testing Algorithm (MUST be marked YES)   1. 3 or more loose stools in 24 hrs. [] Yes [x] No       Additional symptoms:(At least ONE must be marked yes)   1. Abdominal pain/discomfort [] Yes [x] No   2. Fever at least 38C (100.4 F) [] Yes [x] No   3. Elevated WBC(>11,000) [] Yes [x] No       Exclusion Criteria:  (MUST be marked YES)   1. Off laxatives for at least 48 hrs. [x] Yes [] No       Pt status:    Temp:  [97.3  F (36.3  C)-98.3  F (36.8  C)] 97.9  F (36.6  C)  Pulse:  [54-86] 59  Resp:  [12-19] 12  BP: (134-170)/(61-80) 147/75  SpO2:  [92 %-98 %] 98 %           Neuro: A&Ox4.   Cardiac: Bradycardia to 49 bpm while asleep with R.BBB. VSS. SBP goal <170  Respiratory: Sating > 93% on 3L NC.  GI/: Adequate urine output. Pt stated last BM was 6/8.  Diet/appetite: Tolerating reg diet. Fair appetite per patient.  Activity:  Assist of  1, up to chair.  Pain: At acceptable level on current regimen.   Skin: L.eye periorbital swelling and bruising on left side of face as well as scattered bruising and scrapes on the knees.  LDA's: L. PIV    Plan: Continue with POC. Notify primary team with changes.         Goal Outcome  Evaluation:    Problem: Pain Acute  Goal: Optimal Pain Control and Function  Outcome: Progressing     Problem: Fall Injury Risk  Goal: Absence of Fall and Fall-Related Injury  Outcome: Progressing  Intervention: Promote Injury-Free Environment  Recent Flowsheet Documentation  Taken 6/12/2023 0342 by Irene Morel, RN  Safety Promotion/Fall Prevention:    activity supervised    clutter free environment maintained    increased rounding and observation    lighting adjusted    nonskid shoes/slippers when out of bed    room near nurse's station     Problem: Oral Intake Inadequate  Goal: Improved Oral Intake  Outcome: Progressing

## 2023-06-12 NOTE — CONSULTS
Care Management Initial Consult    General Information  Assessment completed with: Patient, Children, Argelia & Janiya, daughter  Type of CM/SW Visit: Initial Assessment    Primary Care Provider verified and updated as needed: Yes   Readmission within the last 30 days: no previous admission in last 30 days   Reason for Consult: discharge planning and elevated risk score  Advance Care Planning:            Communication Assessment  Patient's communication style: spoken language (English or Bilingual)    Hearing Difficulty or Deaf: no   Wear Glasses or Blind: no    Cognitive  Cognitive/Neuro/Behavioral: WDL  Level of Consciousness: alert  Arousal Level: opens eyes spontaneously  Orientation: oriented x 4     Best Language: 0 - No aphasia  Speech: clear, spontaneous, logical    Living Environment:   People in home: alone     Current living Arrangements: house      Able to return to prior arrangements: yes       Family/Social Support:  Care provided by: self  Provides care for: no one  Marital Status:       Description of Support System: Children, Sibling(s); Supportive, Involved    Support Assessment: Adequate family and caregiver support, Adequate social supports    Current Resources:   Patient receiving home care services: No     Community Resources: None  Equipment currently used at home: grab bar, toilet, raised toilet seat, walker, standard (was given walker this hospital stay)  Supplies currently used at home: None    Employment/Financial:  Employment Status: retired        Financial Concerns: No concerns identified   Referral to Financial Worker: No       Does the patient's insurance plan have a 3 day qualifying hospital stay waiver?  No    Lifestyle & Psychosocial Needs:  Social Determinants of Health     Tobacco Use: Not on file   Alcohol Use: Not on file   Financial Resource Strain: Not on file   Food Insecurity: Not on file   Transportation Needs: Not on file   Physical Activity: Not on file   Stress:  "Not on file   Social Connections: Not on file   Intimate Partner Violence: Not on file   Depression: Not on file   Housing Stability: Not on file       Functional Status:  Prior to admission patient needed assistance:   Dependent ADLs:: Independent  Dependent IADLs:: Shopping  Assesssment of Functional Status: At functional baseline    Mental Health Status:  Mental Health Status: No Current Concerns       Chemical Dependency Status:  Chemical Dependency Status: No Current Concerns             Values/Beliefs:  Spiritual, Cultural Beliefs, Bahai Practices, Values that affect care: no               Additional Information:  Per H&P: \"Argelia Villarreal is a 78 year old female with metastatic breast cancer (skeletal and abdominal metastases) who presents as a transfer from [facility] after a fall around 1430 on 06/09. She was walking to get the mail when she fell and hit her head. Did not loose consciousness. Was transported via EMS to [facility]. Workup there demonstrated the following:      - scattered multifocal SAH  - left orbital floor blowout fracture  - left periorbital ecchymosis  - left maxillary sinus hemorrhage     She was planned for admission to [facility], however there was concern that her extraocular movements in the left eye were compromised. Therefore, she was transferred to [facility] for further evaluation and ophthalmology management.      She complains of a headache and nausea.\"     Initial assessment completed due to high risk readmission score. See details above. Care management will follow for any discharge needs.        Care Management Follow Up    Length of Stay (days): 2    Expected Discharge Date: 06/13/2023     Concerns to be Addressed: discharge planning     Patient plan of care discussed at interdisciplinary rounds: Yes    Anticipated Discharge Disposition: Home Care      Anticipated Discharge Services: home nursing, PT, and OT  Anticipated Discharge DME: Walker (Walker already given to patient, " Elevating Foam Bed Wedge (asked MD for order))    Patient/family educated on Medicare website which has current facility and service quality ratings: yes  Education Provided on the Discharge Plan: Yes  Patient/Family in Agreement with the Plan: yes    Referrals Placed by CM/SW: Homecare  Private pay costs discussed: Not applicable    Additional Information:  Writer secured home care with [facility], who states they will see patient within 48 hours of discharge.  Patient's daughter requested wedge for patient's bed at home to keep HOB elevated at 30 degrees.  Writer paged medical team requesting this order, provider entered order, writer provided order to patient's daughter who will obtain the wedge from a medical supply store.        RNCC will continue to follow for discharge planning.          Susu Floyd, RN, BSN  7D RN Care Coordinator    [address] [address]  [email or web address]  Gender pronouns: she/her  Pager: [phone]

## 2023-06-12 NOTE — TELEPHONE ENCOUNTER
Spoke with patient regarding scheduling with Oculoplastics clinic in 1-2 weeks for: Orbital fracture -Per Dr. Crockett. Patient is currently inpatient for subarachnoid hemorrhage. Patient seen in ED. Patient was unable to schedule at this time as her daughter will need to schedule for patient. Patient asked for a call back today 6/12/23 after 2pm for scheduling.-Per Patient

## 2023-06-12 NOTE — PROVIDER NOTIFICATION
"Trauma paged \"EKG is showing RBB and pt is slowly desating, normally on no O2 at home, on 3Ls now.\"    Awaiting orders   "

## 2023-06-12 NOTE — PROGRESS NOTES
Transfer  Transferred to:7D  Via:bed  Reason for transfer:Pt no longer appropriate for 6B- improved and does not need tele  Family: Aware of transfer  Belongings: Packed and sent with pt  Chart: Delivered with pt to next unit  Medications: Meds sent to new unit with pt  Report given to: Josefa

## 2023-06-12 NOTE — PROGRESS NOTES
Brief trauma note    Stable SAH, intraparenchymal hemorrhage  Labs stable,   No acute surgical plan for facial bone fractures  Current OT recommendations: home with assist  Await PT recs today, to determine discharge. If home Ok to go home today. Discussed with patient who prefers to go home      Mina Webb [facility]  Acute Care Trauma  Pager [phone]

## 2023-06-12 NOTE — PROGRESS NOTES
[facility]  Trauma Service Progress Note    Date of Service (when I saw the patient): 06/12/2023     Assessment & Plan   Trauma Mechanism: GLF  Time/date of injury: 1430 on 06/09  Known Injuries:  1. SAH (scattered, small volume, most pronounced along bilateral sylvian fissures)  2. Left periorbital hematoma  3. Left orbital floor blowout fracture with small volume hemorrhage along orbital floor  4. Left maxillary sinus hemorrhage      Neuro/Pain/Psych:  # Traumatic SAH   # TBI: nausea, vomiting, headache   # Hx of migraine   - started prn: zofran, compazine, meclizine,   - Follow-up Head CT 06/10 @ Evolving scattered subarachnoid hemorrhage. Question small focus of intraparenchymal hemorrhage at the frontotemporal junction measuring 0.5 cm versus increased subarachnoid hemorrhage.   - Keppra x 7 days,   - No Aspirin  - no follow-up required      # Acute traumtic pain   - Scheduled: Tylenol, robaxin   - PRN: oxycodone 2.5-5  - Maintain circadian rhythm.  Lights on during the day.  Off at night, minimize cares at night.  OOB during the day.     EENT:  # Left periorbital hematoma  # Left orbital floor blowout fracture with small volume hemorrhage along orbital floor  # Left maxillary sinus hemorrhage   # s/p right eye cataract lab 10/25/22, left cataract removal 11/15/2022  - Ophthalmology plan:  No emergent surgery indicated  Medrol dose pack   Keflex 500 mg PO QID x 7 days then stop,   Erythromycin ophthalmic ointment TID to left eye abrasions  until follow up in clinic,   Afrin BID PRN x 3 days, ice packs 20 min q1-2 hours x 24 hours.   Keep upright as much as able to help with swelling  Nasal precautions: no nose blowing, sneeze with mouth open, no heavy listing or straws  Follow up in occuloplastics clinic in 1-2 weeks - patient will be called to arrange follow up     Pulmonary:  Routine pulm hygience     Cardiovascular:    # Hypertension   - Monitor hemodynamic  status.   - continue PTA: Atenolol 25, losartan 50mg daily, Verapamil 180mg daily,   - Resume Triamterene -hctz 37.5-25mg today with improvements in hyponatremia  - PRN Hydralazine for SBP >170     GI/Nutrition:    - Regular diet     Renal/ Fluids/Electrolytes:  # CKD3  # Acute Hyponatremia, resolved with IVF  # Mild hypomagnesemia  # Incidental finding Right kidney lesion,  - Thoracic spine CT: Incompletely imaged cystic lesion involving the right kidney measuring up to 7.1 cm. Recommend follow-up nonemergent renal ultrasound for further evaluation.  - continue PTA: magnesium gluconate  - electrolyte replacement protocol in place.      Endocrine:  # Stress and steroid induced hyperglycemia  Start Novolog insulin sliding scale if persistently >180g/dL while on oral steroids  - No management indication.      Infectious disease:   Prophylactic Keflex x7 days 2/2 facial bone fractures     Hematology/Oncology:    # Chemotherapy induced pancytopenia   - Hgb 10.4. --> 8.4g/dL, some blood loss anticipated with traumatic injuries, platelet dysfunction, metastatic illness and recent chemotherapy.  - Threshold for transfusion if hgb <7.0 or signs/symptoms of hypoperfusion.     - continue PTA b12, ferrous sulfate, folic acid     # Platelet dysfunction 2/2 ASA  - Hold PTA ASA secondary to subarachnoid hemorrhage     # Stage 4 metastatic Breast cancer   # Diffuse osseous metastatic disease   - PTA Ibrance, 21 days on, 7 days off. Next dose 6/12  - Continue PTA: Oscal, Vit D      Musculoskeletal:  # s/p left THR  # Weakness and deconditioning of chronic illness   # Right ankle knee pain, able to bear weight, monitor for now, no bruising appreciated  - Physical and occupational therapy consults.     Skin:  # Ecchymosis   - dilgent cares to prevent skin breakdown and wound formation.       Lines/ tubes/ drains:  - PIV      General Cares:                 PPI/H2 blocker:  NA                DVT prophylaxis: pcds insetting of acute  ICH                Bowel Regimen/Date of last stool: PTA, ordered                Pulmonary toilet: cough     Code status:  Full Code   Discharge goals:     Adequate pain management:yes    VSS x24 hours: NA    Hemoglobin stable x 48 hours: NA    Ambulating safely and/or therapy evals complete: yes    Drains/lines removed or plan in place to manage: yes    Teaching done:   Expected D/C date: patient is ready for discharge, repeat hemoglobin stable, PT recommending TCU , improvements with mobility today, home with assist per OT and pt prefers to go home  Home tomorrow with home PT/OT    Interval History   Improve left eye swelling and pain. Reports poor sleep and vivid dreams last night.  ROS x 8 negative with exception of those things listed in interval hx    Physical Exam   Temp: 97.8  F (36.6  C) Temp src: Oral BP: (!) 168/88 Pulse: 62   Resp: 17 SpO2: 99 % O2 Device: Nasal cannula Oxygen Delivery: 3 LPM  Vitals:    06/11/23 1604 06/12/23 0300   Weight: (!) 194.5 kg (428 lb 12.7 oz) 87.9 kg (193 lb 12.8 oz)     Vital Signs with Ranges  Temp:  [97.6  F (36.4  C)-98.3  F (36.8  C)] 97.8  F (36.6  C)  Pulse:  [54-86] 62  Resp:  [12-19] 17  BP: (147-170)/(68-88) 168/88  SpO2:  [92 %-99 %] 99 %  I/O last 3 completed shifts:  In: 1104.58 [P.O.:240; I.V.:864.58]  Out: -     Stephani Coma Scale - Total 15/15  Eye Response (E): 4   4= spontaneous, 3= to verbal/voice, 2= to pain, 1= No response   Verbal Response (V): 5   5= Orientated, converses, 4= Confused, converses, 3= Inappropriate words, 2= Incomprehensible sounds, 1=No response   Motor Response (M): 6   6= Obeys commands, 5= Localizes to pain, 4= Withdrawal to pain, 3=Fexion to pain, 2= Extension to pain, 1= No response   Constitutional: Awake, alert, cooperative, no apparent distress.  Eyes: moderate left periorbital/facial, chin and neck, left shoulder ecchymosis and edema  ENT: Normocephalic  Respiratory: No increased work of breathing, good air exchange, clear to  auscultation bilaterally, no crackles or wheezing.  Cardiovascular:  regular rate and rhythm, normal S1 and S2  GI: Normal bowel sounds, abdomen soft, non-distended, non-tender, no guarding  Genitourinary:  Voids spont  Skin:  Warm and dry except for ecchymosis   Musculoskeletal: There is no redness, warmth, or swelling of the joints.  Pedal pulse palpated.  Neurologic: Awake, alert, oriented. Strength and sensory is intact. No focal deficits.  Neuropsychiatric: Calm, normal eye contact, alert, affect appropriate to situation, oriented, thought process normal.    BITA Jean CNP  To contact the trauma service use job code pager [phone],   Numeric texts or alpha text through [facility]

## 2023-06-12 NOTE — PROVIDER NOTIFICATION
Page to Tracey Enriquez      Patient wants to take losartan only once a day.  Patient stated not sleeping well and having vivid dreams when eyes are closed.  Thanks

## 2023-06-13 ENCOUNTER — DOCUMENTATION ONLY (OUTPATIENT)
Dept: OTHER | Facility: CLINIC | Age: 78
End: 2023-06-13
Payer: MEDICARE

## 2023-06-13 VITALS
RESPIRATION RATE: 18 BRPM | HEART RATE: 55 BPM | DIASTOLIC BLOOD PRESSURE: 66 MMHG | HEIGHT: 64 IN | SYSTOLIC BLOOD PRESSURE: 152 MMHG | WEIGHT: 193.2 LBS | BODY MASS INDEX: 32.98 KG/M2 | TEMPERATURE: 98.1 F | OXYGEN SATURATION: 91 %

## 2023-06-13 LAB
BASOPHILS # BLD AUTO: 0 10E3/UL (ref 0–0.2)
BASOPHILS NFR BLD AUTO: 0 %
EOSINOPHIL # BLD AUTO: 0 10E3/UL (ref 0–0.7)
EOSINOPHIL NFR BLD AUTO: 0 %
ERYTHROCYTE [DISTWIDTH] IN BLOOD BY AUTOMATED COUNT: 12.9 % (ref 10–15)
GLUCOSE BLDC GLUCOMTR-MCNC: 103 MG/DL (ref 70–99)
HCT VFR BLD AUTO: 26.5 % (ref 35–47)
HGB BLD-MCNC: 9.2 G/DL (ref 11.7–15.7)
IMM GRANULOCYTES # BLD: 0 10E3/UL
IMM GRANULOCYTES NFR BLD: 1 %
LYMPHOCYTES # BLD AUTO: 0.4 10E3/UL (ref 0.8–5.3)
LYMPHOCYTES NFR BLD AUTO: 12 %
MAGNESIUM SERPL-MCNC: 1.6 MG/DL (ref 1.7–2.3)
MCH RBC QN AUTO: 41.6 PG (ref 26.5–33)
MCHC RBC AUTO-ENTMCNC: 34.7 G/DL (ref 31.5–36.5)
MCV RBC AUTO: 120 FL (ref 78–100)
MONOCYTES # BLD AUTO: 0.9 10E3/UL (ref 0–1.3)
MONOCYTES NFR BLD AUTO: 24 %
NEUTROPHILS # BLD AUTO: 2.4 10E3/UL (ref 1.6–8.3)
NEUTROPHILS NFR BLD AUTO: 63 %
NRBC # BLD AUTO: 0 10E3/UL
NRBC BLD AUTO-RTO: 1 /100
PLATELET # BLD AUTO: 97 10E3/UL (ref 150–450)
POTASSIUM SERPL-SCNC: 4.4 MMOL/L (ref 3.4–5.3)
RBC # BLD AUTO: 2.21 10E6/UL (ref 3.8–5.2)
WBC # BLD AUTO: 3.8 10E3/UL (ref 4–11)

## 2023-06-13 PROCEDURE — 250N000013 HC RX MED GY IP 250 OP 250 PS 637: Performed by: STUDENT IN AN ORGANIZED HEALTH CARE EDUCATION/TRAINING PROGRAM

## 2023-06-13 PROCEDURE — 250N000009 HC RX 250: Performed by: NURSE PRACTITIONER

## 2023-06-13 PROCEDURE — 36415 COLL VENOUS BLD VENIPUNCTURE: CPT | Performed by: SURGERY

## 2023-06-13 PROCEDURE — 250N000012 HC RX MED GY IP 250 OP 636 PS 637: Performed by: STUDENT IN AN ORGANIZED HEALTH CARE EDUCATION/TRAINING PROGRAM

## 2023-06-13 PROCEDURE — 83735 ASSAY OF MAGNESIUM: CPT | Performed by: SURGERY

## 2023-06-13 PROCEDURE — 250N000013 HC RX MED GY IP 250 OP 250 PS 637: Performed by: PHYSICIAN ASSISTANT

## 2023-06-13 PROCEDURE — 84132 ASSAY OF SERUM POTASSIUM: CPT | Performed by: SURGERY

## 2023-06-13 PROCEDURE — 99239 HOSP IP/OBS DSCHRG MGMT >30: CPT | Performed by: NURSE PRACTITIONER

## 2023-06-13 PROCEDURE — 250N000013 HC RX MED GY IP 250 OP 250 PS 637: Performed by: SURGERY

## 2023-06-13 PROCEDURE — 85025 COMPLETE CBC W/AUTO DIFF WBC: CPT | Performed by: SURGERY

## 2023-06-13 PROCEDURE — 250N000013 HC RX MED GY IP 250 OP 250 PS 637: Performed by: NURSE PRACTITIONER

## 2023-06-13 RX ORDER — METHYLPREDNISOLONE 4 MG/1
4 TABLET ORAL
Qty: 2 TABLET | Refills: 0 | Status: SHIPPED | OUTPATIENT
Start: 2023-06-14 | End: 2023-06-16

## 2023-06-13 RX ORDER — POLYETHYLENE GLYCOL 3350 17 G/17G
17 POWDER, FOR SOLUTION ORAL DAILY
Qty: 238 G | Refills: 0 | Status: SHIPPED | OUTPATIENT
Start: 2023-06-13 | End: 2023-06-13

## 2023-06-13 RX ORDER — POLYETHYLENE GLYCOL 3350 17 G/17G
17 POWDER, FOR SOLUTION ORAL 2 TIMES DAILY
Status: DISCONTINUED | OUTPATIENT
Start: 2023-06-13 | End: 2023-06-13 | Stop reason: HOSPADM

## 2023-06-13 RX ORDER — METHYLPREDNISOLONE 4 MG/1
4 TABLET ORAL AT BEDTIME
Qty: 2 TABLET | Refills: 0 | Status: SHIPPED | OUTPATIENT
Start: 2023-06-13 | End: 2023-06-15

## 2023-06-13 RX ADMIN — OXYMETAZOLINE HYDROCHLORIDE 2 SPRAY: 0.05 SPRAY NASAL at 08:30

## 2023-06-13 RX ADMIN — METHOCARBAMOL 500 MG: 500 TABLET ORAL at 08:30

## 2023-06-13 RX ADMIN — CEPHALEXIN 500 MG: 500 CAPSULE ORAL at 11:26

## 2023-06-13 RX ADMIN — ERYTHROMYCIN 1 G: 5 OINTMENT OPHTHALMIC at 08:29

## 2023-06-13 RX ADMIN — CYANOCOBALAMIN TAB 1000 MCG 1000 MCG: 1000 TAB at 08:30

## 2023-06-13 RX ADMIN — ACETAMINOPHEN 975 MG: 325 TABLET, FILM COATED ORAL at 08:29

## 2023-06-13 RX ADMIN — FERROUS SULFATE TAB 325 MG (65 MG ELEMENTAL FE) 325 MG: 325 (65 FE) TAB at 08:29

## 2023-06-13 RX ADMIN — METHYLPREDNISOLONE 4 MG: 4 TABLET ORAL at 10:14

## 2023-06-13 RX ADMIN — Medication 25 MCG: at 08:30

## 2023-06-13 RX ADMIN — ATENOLOL 25 MG: 25 TABLET ORAL at 08:29

## 2023-06-13 RX ADMIN — TRIAMTERENE AND HYDROCHLOROTHIAZIDE 1 TABLET: 37.5; 25 TABLET ORAL at 08:29

## 2023-06-13 RX ADMIN — Medication 800 MCG: at 08:29

## 2023-06-13 RX ADMIN — METHOCARBAMOL 500 MG: 500 TABLET ORAL at 11:26

## 2023-06-13 RX ADMIN — METHYLPREDNISOLONE 4 MG: 4 TABLET ORAL at 11:26

## 2023-06-13 RX ADMIN — CALCIUM 500 MG: 500 TABLET ORAL at 10:14

## 2023-06-13 RX ADMIN — CEPHALEXIN 500 MG: 500 CAPSULE ORAL at 08:30

## 2023-06-13 RX ADMIN — OXYCODONE HYDROCHLORIDE 5 MG: 5 TABLET ORAL at 10:31

## 2023-06-13 RX ADMIN — SENNOSIDES AND DOCUSATE SODIUM 2 TABLET: 50; 8.6 TABLET ORAL at 08:30

## 2023-06-13 RX ADMIN — Medication 500 MG: at 08:29

## 2023-06-13 RX ADMIN — LEVETIRACETAM 750 MG: 750 TABLET, FILM COATED ORAL at 08:29

## 2023-06-13 ASSESSMENT — ACTIVITIES OF DAILY LIVING (ADL)
ADLS_ACUITY_SCORE: 22

## 2023-06-13 NOTE — PROGRESS NOTES
Care Management Follow Up    Length of Stay (days): 3    Expected Discharge Date: 06/13/2023     Concerns to be Addressed: discharge planning     Patient plan of care discussed at interdisciplinary rounds: Yes    Anticipated Discharge Disposition: Home Care     Anticipated Discharge Services: None    Anticipated Discharge DME: Walker (Walker already given to patient, Elevating Foam Bed Wedge (asked MD for order))    Patient/family educated on Medicare website which has current facility and service quality ratings: yes    Education Provided on the Discharge Plan: Yes    Patient/Family in Agreement with the Plan: yes    Referrals Placed by CM/SW: Homecare    Private pay costs discussed: Not applicable    Additional Information:    CHW delegated by unit Katya HINKLE, to find a notary to assist with signing the pt's Health Care Directive (HCD). CHW went to pt's room to see if it has been filled out or not, with pt noting that her daughter is on her way with the HCD.     CHW alerted the notary in hospital, ANTONIETA Vela, who came up to help notarize the HCD. HCD was then faxed over to [facility].    Original HCD given to pt with a copies made for daughter and son. Copy also added into pt's hard chart.      [email or web address]  [phone]

## 2023-06-13 NOTE — PROVIDER NOTIFICATION
Page to Mina Webb    Pt is discharging to home will need oxycodone prescription signed.  Thanks    .Vane Brooks RN on 6/13/2023 at 10:40 AM

## 2023-06-13 NOTE — TELEPHONE ENCOUNTER
FUTURE VISIT INFORMATION      FUTURE VISIT INFORMATION:    Date: 6/16/23    Time: 11:15am    [place] [facility] [place]  REFERRAL INFORMATION:    Referring provider:  Dr. Crockett    Referring providers clinic:  [facility]    Reason for visit/diagnosis  Orbital fracture    RECORDS REQUESTED FROM:       Clinic name Comments Records Status Imaging Status   [facility] ED 6/9/23  CT Facial bones 6/9/23  CT Head 6/9/23  CT Head 6/10/23 EPIC    [facility] 6/10/23 EPIC

## 2023-06-13 NOTE — PROGRESS NOTES
Care Management Discharge Note    Discharge Date: 06/13/2023       Discharge Disposition: Home Care    Discharge Services: Home nursing, home PT, home OT.     Discharge DME: Walker (Walker already given to patient), Elevating Foam Bed Wedge,    Discharge Transportation: family or friend will provide    Private pay costs discussed: Not applicable    Does the patient's insurance plan have a 3 day qualifying hospital stay waiver?  No    PAS Confirmation Code: n/a  Patient/family educated on Medicare website which has current facility and service quality ratings: yes    Education Provided on the Discharge Plan: Yes  Persons Notified of Discharge Plans: Patient, daughter, Lisbeth Care Alejandrina (via CM/AC sticky note)  Patient/Family in Agreement with the Plan: yes    Handoff Referral Completed: Yes    Additional Information:  Home care services mentioned above secured with [facility], who states they will see patient within 48 hours of discharge.  Patient's daughter requested wedge for patient's bed at home to keep HOB elevated at 30 degrees, MD entered miscellaneous DME order for wedge, writer gave DME order to patient, her daughter will obtain wedge at medical supply store.  Patient's daughter brought in patient's HCD, which just needs to be signed, and notarized.  CHW, Loretta Coppola, communicated with her fellow CHW, Dane Cano, to coordinate getting HCD notarized.      All discharge needs met.        Susu Floyd, RN, BSN  7D RN Care Coordinator    [address] [address]  [email or web address]  Gender pronouns: she/her  Pager: [phone]

## 2023-06-13 NOTE — PLAN OF CARE
Goal Outcome Evaluation:  [id number]  AVSS, alert and oriented x 4, neuro's are intact. C/o pain left face, abdomen, given PRN oxycodone x 1, with relief.  Up with sba, voiding adequately, given PRN miralax per pt request.  Continue to monitor care.

## 2023-06-13 NOTE — PLAN OF CARE
Goal Outcome Evaluation:    Nursing Focus: Discharge    D: Patient discharged to home at 1200. Patient and daughter accompanied by transport.    I: Discharge prescriptions sent to discharge pharmacy to be filled. All discharge medications and instructions reviewed with patient and daughter. Patient instructed to call clinic triage nurse if pt experiences a fever >100.4, uncontrolled nausea, vomiting, diarrhea, or pain; or experiences any signs or symptoms of bleeding. Other phone numbers to call with questions or concerns after discharge reviewed. PIV removed. Education completed.    A: Patient and daughter verbalized understanding of discharge medications and instructions. Patient will  medications at discharge pharmacy.    P: Patient to follow-up in clinic on June 16 with clinic.

## 2023-06-13 NOTE — PLAN OF CARE
"[id number]  Goal Outcome Evaluation:    BP (!) 158/61 (BP Location: Left arm, Cuff Size: Adult Regular)   Pulse 67   Temp 98.1  F (36.7  C) (Oral)   Resp 18   Ht 1.613 m (5' 3.5\")   Wt 87.6 kg (193 lb 3.2 oz)   SpO2 93%   BMI 33.69 kg/m      O2 86% on RA, placed on 3 L nasal canula. Hypertensive high of 158/61. Denies pain/nausea/sob. SBA. Last BM 6/9, continue w/ bowel meds. x2 daily BG checks. qshift neuro checks, intact. Plan for discharge today, going home w/ assistance from daughter.   "

## 2023-06-13 NOTE — PLAN OF CARE
Occupational Therapy Discharge Summary    Reason for therapy discharge:    Discharged to home with home therapy.    Progress towards therapy goal(s). See goals on Care Plan in [facility] electronic health record for goal details.  Goals partially met.  Barriers to achieving goals:   discharge from facility.    Therapy recommendation(s):    Continued therapy is recommended.  Rationale/Recommendations:  Pt would benefit from HHOT to maximize safety and IND with I/ADLs.

## 2023-06-13 NOTE — DISCHARGE SUMMARY
[facility]    Discharge Summary  Trauma Surgery Service    Date of Admission:  6/10/2023  Date of Discharge:  6/13/2023  Attending Physician: Dr. Villar   Discharging Provider: Mina Webb CNP  Date of Service (when I saw the patient): 06/13/23    Primary Provider: Lita Anna  Primary Care clinic: [address] [facility] [address]  Phone: [phone]  Fax number: [phone]     Discharge Diagnoses   Fall, initial encounter  Orbital floor (blow-out) closed fracture (H)  Traumatic subarachnoid hemorrhage with unknown loss of consciousness status, initial encounter (H)  Contusion of face, initial encounter    Hospital Course   Argelia Villarreal is a 78 year old female with a hx of metastatic breast cancer (skeletal and abdominal metastases) who presented to the ED for evaluation following a fall at home. She was walking to get the mail when she fell and hit her head. Denied LOC, on Aspirin daily, no anticoagulation. Trauma work up demonstrated:  - Scattered multifocal SAH  - left orbital floor blowout fracture  - left periorbital ecchymosis  - left maxillary sinus hemorrhage  She was admitted to the trauma service for management of the management of her injuries:    # Traumatic Subarachnoid hemorrhage and intraparenchymal hemorrhages  She was seen and evaluated by Neurosurgery and monitored with serial neurological exams which remains stable. A repeat CT head was obtained 06/10/23 @ [id number]:  1. No substantial change in expected evolution of bilateral  subarachnoid hemorrhages. No midline shift, mass effect or  hydrocephalus.  2. Stable depressed left inferior orbital floor fracture, blood  filling the left maxillary sinus and soft tissue swelling around the  orbit.  She should complete a 7 day course of Keppra for seizure prophylaxis, no Aspirin.  Follow up recommended in 2 weeks with a CT head     # Acute traumtic pain  Prior to discharge pain  was well managed with scheduled tylenol, robaxin and PRN Oxycodone. Anticipate being able to wean off over the next few days     # Left periorbital hematoma  # Left orbital floor blowout fracture with small volume hemorrhage along orbital floor  # Left maxillary sinus hemorrhage   # s/p right eye cataract lab 10/25/22, left cataract removal 11/15/2022.   No acute inpatient recommendations per Opthalmology. She was started on a medrol dose pack for pain and  Inflammation.   Keflex x 7 days prophylaxis for facial bone fractures  Erythromycin ophthalmic ointment TID to left eye abrasions  until follow up in clinic,   Afrin BID PRN x 3 days  Keep upright as much as able to help with swelling  Nasal precautions: no nose blowing, sneeze with mouth open, no heavy listing or straws  Follow up in occuloplastics clinic scheduled Friday June 16th    # Hypertension   Continue home antihypertensive regimen: Atenolol 25, losartan 50mg daily, Verapamil 180mg daily, Triamterene -hctz 37.5-25mg    # Incidental finding Right kidney lesion,  - Thoracic spine CT: Incompletely imaged cystic lesion involving the right kidney measuring up to 7.1 cm. Recommend follow-up nonemergent renal ultrasound for further evaluation.  Patient to followup routinely with her primary oncologist  - continue PTA: magnesium gluconate     # Chemotherapy induced pancytopenia   # Anemia of chronic illness  # Acute blood loss anemia  -Her hemoglobin was 10.4g/dL on admission, prior to discharge was stable @ 9.2g/dL. Some blood loss is anticipated with traumatic injuries, platelet dysfunction, metastatic illness and recent chemotherapy. No transfusions required this hospital stay.  - Threshold for transfusion if hgb <7.0 or signs/symptoms of hypoperfusion.     - continue PTA b12, ferrous sulfate, folic acid     # Platelet dysfunction 2/2 ASA  - Hold PTA ASA secondary to subarachnoid hemorrhage     # Stage 4 metastatic Breast cancer   # Diffuse osseous metastatic  disease   - Continue Ibrance, 21 days on, 7 days off. Resumed 6/12  - Continue PTA: Oscal, Vit D    - Continue follow up with Primary Oncology.  Therapy Recommendations:   Current status of physical therapies and Occupational  on discharge:   Home with home PT/OT  Code Status   Full Code    SUBJECTIVE: Prior to discharge Dee reported a good sleep, good pain control. +BM last night. The discharge and follow up plan was discussed with her. Verbalized understanding. Her daughter will stay with her post hospital stay. She was on room air at the time of my exam, denied shortness of breath.    Physical Exam   Temp: 98.1  F (36.7  C) Temp src: Oral BP: (!) 152/66 Pulse: 55   Resp: 18 SpO2: 91 % O2 Device: None (Room air) Oxygen Delivery:Room air  Vitals:    06/11/23 1604 06/12/23 0300 06/12/23 1047   Weight: (!) 194.5 kg (428 lb 12.7 oz) 87.9 kg (193 lb 12.8 oz) 87.6 kg (193 lb 3.2 oz)     Vital Signs with Ranges  Temp:  [97.5  F (36.4  C)-98.5  F (36.9  C)] 98.1  F (36.7  C)  Pulse:  [55-75] 55  Resp:  [16-20] 18  BP: (152-168)/(61-66) 152/66  SpO2:  [84 %-95 %] 91 %  I/O last 3 completed shifts:  In: 200 [P.O.:200]  Out: 1300 [Urine:1300]    Stephani Coma Scale - Total 15/15  Eye Response (E): 4   4= spontaneous, 3= to verbal/voice, 2= to pain, 1= No response   Verbal Response (V): 5   5= Orientated, converses, 4= Confused, converses, 3= Inappropriate words, 2= Incomprehensible sounds, 1=No response   Motor Response (M): 6   6= Obeys commands, 5= Localizes to pain, 4= Withdrawal to pain, 3=Fexion to pain, 2= Extension to pain, 1= No response   Constitutional: Awake, alert, cooperative, no apparent distress.  Eyes: moderate left periorbital/facial, chin and neck, left shoulder ecchymosis and edema  ENT: Normocephalic  Respiratory: No increased work of breathing, good air exchange, clear to auscultation bilaterally, no crackles or wheezing.  Cardiovascular:  regular rate and rhythm, normal S1 and S2  GI: Normal bowel  "sounds, abdomen soft, non-distended, non-tender, no guarding  Genitourinary:  Voids spont  Skin:  Warm and dry except for ecchymosis   Musculoskeletal: There is no redness, warmth, or swelling of the joints.  Pedal pulse palpated.  Neurologic: Awake, alert, oriented. Strength and sensory is intact. No focal deficits.  Neuropsychiatric: Calm, normal eye contact, alert, affect appropriate to situation, oriented, thought process normal.    Discharge Disposition   Discharged to home  Condition at discharge: Stable  Discharge VS: Blood pressure (!) 152/66, pulse 55, temperature 98.1  F (36.7  C), temperature source Oral, resp. rate 18, height 1.613 m (5' 3.5\"), weight 87.6 kg (193 lb 3.2 oz), SpO2 91 %.    Consultations This Hospital Stay   OCCUPATIONAL THERAPY ADULT IP CONSULT  PHYSICAL THERAPY ADULT IP CONSULT  CARE MANAGEMENT / SOCIAL WORK IP CONSULT    Discharge Orders      CT Head w/o Contrast     Home Care Referral      Reason for your hospital stay    Fall  Traumatic subarachnoid hemorrhage and intraparenchymal hemorrhage  Left orbital fracture     Activity    Your activity upon discharge: activity as tolerated  Sinus precautions:Sinus precautions including: no nose blowing, no straining, no heavy lifting, no bending for 2 weeks, sneezing with mouth open, no smoking, no using straws     Adult [facility] Follow-up and recommended labs and tests    Follow up with your primary care provider for continued medical care and hospital follow up in 5-10 days.    Neurosurgery Clinic in 2weeks with a CT head  [facility]  Floor 3   [address] [place] [address]  Appointments: [phone]    Ophthalmology (Eye) Clinic with the Occuloplastics team in 7 days, they will call to schedule  [facility]  Floor 4   [address] [place] [address]   Appointments: [phone]     You have been involved in a recent trauma incident resulting in an injury.  Studies show us " "that people affected by trauma have higher levels of post-traumatic stress disorder (PTSD) and/or depressive symptoms during the year following an injury.     Please consider the following.  Have you:  Had migraines about the event(s) or thought about the event(s) when you didn't want to?  Tried hard not to think about the event(s) or went out of your way to avoid situations that reminded you of the event(s)?  Been constantly on guard, watchful, or easily startled?  Felt numb or detached from people, activities, or your surroundings?   Felt guilty or unable to stop blaming yourself or others for the event(s) or any problems the event (s) may have caused?    If you answered \"yes\" to 3 or more of these questions, or if you simply want to discuss any of your feelings further, we recommend that you talk with your Primary Care Provider or a mental health professional.        Appointments on [place] and/or [facility] (with [facility] or [facility] provider or service). Call [phone] if you haven't heard regarding these appointments within 7 days of discharge.     [facility] Order for DME - ONLY FOR DME    I, the undersigned, certify that the above prescribed supplies are medically necessary for this patient and is both reasonable and necessary in reference to accepted standards of medical and necessary in reference to accepted standards of medical practice in the treatment of this patient's condition and is not prescribed as a convenience.      Miscellaneous Order for DME - ONLY FOR DME    I, the undersigned, certify that the above prescribed supplies are medically necessary for this patient and is both reasonable and necessary in reference to accepted standards of medical and necessary in reference to accepted standards of medical practice in the treatment of this patient's condition and is not prescribed as a convenience.      Diet    Follow this diet upon discharge: Regular     Discharge Medications   Current Discharge " Medication List      START taking these medications    Details   acetaminophen (TYLENOL) 325 MG tablet Take 3 tablets (975 mg) by mouth 3 times daily  Qty: 30 tablet, Refills: 0    Associated Diagnoses: SAH (subarachnoid hemorrhage) (H)      cephALEXin (KEFLEX) 500 MG capsule Take 1 capsule (500 mg) by mouth 4 times daily for 5 days  Qty: 20 capsule, Refills: 0    Associated Diagnoses: Orbital floor (blow-out) closed fracture (H)      erythromycin (ROMYCIN) 5 MG/GM ophthalmic ointment Place Into the left eye 3 times daily for 7 days  Qty: 3.5 g, Refills: 0    Associated Diagnoses: Orbital floor (blow-out) closed fracture (H)      levETIRAcetam (KEPPRA) 750 MG tablet Take 1 tablet (750 mg) by mouth 2 times daily for 5 days  Qty: 10 tablet, Refills: 0    Associated Diagnoses: SAH (subarachnoid hemorrhage) (H)      Lidocaine (LIDOCARE) 4 % Patch Place 1 patch onto the skin every 24 hours To prevent lidocaine toxicity, patient should be patch free for 12 hrs daily.  Qty: 10 patch, Refills: 0    Associated Diagnoses: Orbital floor (blow-out) closed fracture (H)      methocarbamol (ROBAXIN) 500 MG tablet Take 1 tablet (500 mg) by mouth 4 times daily for 7 days  Qty: 28 tablet, Refills: 0    Associated Diagnoses: Orbital floor (blow-out) closed fracture (H)      !! methylPREDNISolone (MEDROL) 4 MG tablet Take 1 tablet (4 mg) by mouth every morning (before breakfast) for 2 days  Qty: 2 tablet, Refills: 0    Associated Diagnoses: Closed blow-out fracture of left orbit, initial encounter (H); Contusion of face, initial encounter      !! methylPREDNISolone (MEDROL) 4 MG tablet Take 1 tablet (4 mg) by mouth At Bedtime for 2 days  Qty: 2 tablet, Refills: 0    Associated Diagnoses: Closed blow-out fracture of left orbit, initial encounter (H); Contusion of face, initial encounter      oxyCODONE (ROXICODONE) 5 MG tablet Take 1 tablet (5 mg) by mouth every 8 hours as needed for moderate to severe pain  Qty: 16 tablet, Refills: 0     Associated Diagnoses: Orbital floor (blow-out) closed fracture (H)      senna-docusate (SENOKOT-S/PERICOLACE) 8.6-50 MG tablet Take 2 tablets by mouth 2 times daily  Qty: 12 tablet, Refills: 0    Associated Diagnoses: SAH (subarachnoid hemorrhage) (H); Orbital floor (blow-out) closed fracture (H)       !! - Potential duplicate medications found. Please discuss with provider.      CONTINUE these medications which have NOT CHANGED    Details   Ascorbic Acid (VITAMIN C) 500 MG CAPS Take 500 mg by mouth At Bedtime      atenolol (TENORMIN) 25 MG tablet Take 25 mg by mouth daily      Biotin 07390 MCG TABS Take 1 tablet by mouth 2 times daily      BLACK ELDERBERRY PO Take 100 mg by mouth daily      cholecalciferol (VITAMIN D3) 25 mcg (1000 units) capsule Take 1 capsule by mouth daily      co-enzyme Q-10 100 MG CAPS capsule Take 100 mg by mouth daily      Cranberry-Cholecalciferol 4200-500 MG-UNIT CAPS Take 1 capsule by mouth daily      cyanocobalamin (VITAMIN B-12) 1000 MCG tablet Take 1,000 mcg by mouth daily      doxycycline monohydrate (MONODOX) 100 MG capsule Take 100 mg by mouth daily as needed (when on chemo)      ferrous sulfate (FEROSUL) 325 (65 Fe) MG tablet Take 325 mg by mouth 2 times daily      fish oil-omega-3 fatty acids 1000 MG capsule Take 1 g by mouth every morning      Folic Acid (FOLATE PO) Take 800 mcg by mouth 2 times daily      Garlic 1000 MG CAPS Take 1 capsule by mouth daily      losartan (COZAAR) 50 MG tablet Take 50 mg by mouth daily      magnesium 250 MG tablet Take 1 tablet by mouth daily      Multiple Vitamins-Minerals (ICAPS AREDS 2 PO) Take 1 capsule by mouth 2 times daily      palbociclib (IBRANCE) 100 MG capsule Take 100 mg by mouth daily with food 21 days on  7 days off      Probiotic Product (PROBIOTIC-10 PO) Take 1 tablet by mouth daily      triamterene-HCTZ (MAXZIDE-25) 37.5-25 MG tablet Take 1 tablet by mouth daily      verapamil ER (CALAN-SR) 180 MG CR tablet Take 180 mg by mouth At  Bedtime         STOP taking these medications       aspirin 81 MG EC tablet Comments:   Reason for Stopping:             Allergies   Allergies   Allergen Reactions     Lisinopril Cough     Morphine Hives     Data   Most Recent 3 CBC's:  Recent Labs   Lab Test 06/13/23  0527 06/11/23  1056 06/11/23  0632 06/10/23  0132 06/09/23  1900   WBC 3.8*  --  2.7*  --  3.3*   HGB 9.2* 9.5* 8.4*   < > 10.4*   *  --  125*  --  120*   PLT 97*  --  79*  --  104*    < > = values in this interval not displayed.      Most Recent 3 BMP's:  Recent Labs   Lab Test 06/13/23  0813 06/13/23  0527 06/12/23  1801 06/12/23  1454 06/12/23  0544 06/11/23  1734 06/11/23  0632 06/09/23  1900   NA  --   --   --   --  140  --  138 135*   POTASSIUM  --  4.4  --   --  4.0  3.9  --  4.5 3.9   CHLORIDE  --   --   --   --  106  --  105 98   CO2  --   --   --   --  23 --  23 24   BUN  --   --   --   --  14.5  --  19.0 24.6*   CR  --   --   --   --  1.00*  --  1.10* 1.34*   ANIONGAP  --   --   --   --  11  --  10 13   DELANO  --   --   --   --  10.0  --  8.3* 9.6   *  --  117* 126* 143*   < > 163* 118*    < > = values in this interval not displayed.     Most Recent 2 LFT's:  Recent Labs   Lab Test 06/11/23  0632   AST 18   ALT 10   ALKPHOS 47   BILITOTAL 0.2     Most Recent INR's and Anticoagulation Dosing History:  Anticoagulation Dose History         Latest Ref Rng & Units 6/9/2023   Recent Dosing and Labs   INR 0.85 - 1.15 1.03                Most Recent 3 Troponin's:No lab results found.  Most Recent 6 Bacteria Isolates From Any Culture (See EPIC Reports for Culture Details):No lab results found.  Most Recent TSH, T4 and A1c Labs:No lab results found.  Results for orders placed or performed during the hospital encounter of 06/10/23   CT Head w/o Contrast    Narrative    EXAM: CT HEAD W/O CONTRAST  LOCATION: [facility]  DATE/TIME: 6/10/2023 3:17 AM CDT    INDICATION: Subarachnoid hemorrhage.  Repeat exam.  COMPARISON: 6/9/2023.  TECHNIQUE: Routine CT Head without IV contrast. Multiplanar reformats. Dose reduction techniques were used.    FINDINGS:  INTRACRANIAL CONTENTS: No abnormal extra-axial fluid collection. Again seen is scattered bilateral subarachnoid hemorrhage noted throughout the cerebral convexities of the supratentorial brain, similar in distribution, but slightly diminished within the   right sylvian fissure. Question small focus of intraparenchymal hemorrhage versus increased subarachnoid hemorrhage within the left posterior sylvian fissure at the frontotemporal junction measuring 0.6 cm (image 18 series 4). No CT evidence of acute   infarct. Mild presumed chronic small vessel ischemic changes. Mild generalized volume loss. No hydrocephalus. No midline shift identified.     VISUALIZED ORBITS/SINUSES/MASTOIDS: Prior bilateral cataract surgery. Visualized portions of the orbits are otherwise unremarkable. Near-complete opacification of the left maxillary sinus. Ethmoid air cell mucosal thickening. No middle ear or mastoid   effusion.    BONES/SOFT TISSUES: No calvarial fracture. Bilateral temporomandibular joint osteoarthritis. Left lateral scalp soft tissue swelling/contusion. Left facial soft tissue swelling/contusion.      Impression    IMPRESSION:  1.  Evolving scattered subarachnoid hemorrhage, as above.  2.  Question small focus of intraparenchymal hemorrhage at the frontotemporal junction measuring 0.5 cm versus increased subarachnoid hemorrhage. Continued attention on follow-up imaging is recommended.   CT Thoracic Spine w/o Contrast    Narrative    EXAM: CT THORACIC SPINE W/O CONTRAST  LOCATION: [facility]  DATE/TIME: 6/10/2023 3:16 AM CDT    INDICATION: Fall. Traumatic injury.  COMPARISON: None.  TECHNIQUE: Routine CT Thoracic Spine without IV contrast. Multiplanar reformats. Dose reduction techniques were used.     FINDINGS:  VERTEBRA:  Normal vertebral body heights. Diffuse sclerotic osseous metastatic disease. No definite acute fracture or posttraumatic subluxation.    CANAL/FORAMINA: Mild multilevel degenerative changes. No high-grade central spinal canal stenosis. Disc osteophyte complex at T10-T11 causes mild canal stenosis. Disc osteophyte complex at T12-L1 causes mild canal stenosis. No high-grade neural foraminal   stenosis.    PARASPINAL: No acute extraspinal abnormality. Scattered vascular calcifications. Bilateral dependent atelectasis identified involving the lower lobes. Incompletely imaged cystic lesion involving the right kidney measuring up to 7.1 cm.      Impression    IMPRESSION:  1.  No fracture or posttraumatic subluxation.  2.  No high-grade spinal canal or neural foraminal stenosis.  3.  Diffuse sclerotic osseous metastatic disease.  4.  Incompletely imaged cystic lesion involving the right kidney measuring up to 7.1 cm. Recommend follow-up nonemergent renal ultrasound for further evaluation.     Head CT w/o contrast    Narrative    EXAM: CT HEAD W/O CONTRAST  6/10/2023 9:48 AM     HISTORY: repeat SAH, ? Expansion       COMPARISON: CT head 6/9/2023, 6/10/2023 at 0305 hours    TECHNIQUE: Using multidetector thin collimation helical acquisition  technique, axial, coronal and sagittal CT images from the skull base  to the vertex were obtained without intravenous contrast.   (topogram) image(s) also obtained and reviewed.    FINDINGS:  Scattered areas of hyperdense blood layering within the within the  bilateral parietal, left frontal, bilateral perisylvian subarachnoid  spaces. No mass effect or midline shift. Stable scattered foci of  areas of low attenuation within the frontal white matter likely  leukoaraiosis from chronic microvascular ischemic disease. No acute  loss of gray-white matter differentiation in the cerebral hemispheres.  Ventricles are proportionate to the cerebral sulci. Clear basal  cisterns.    Stable  left inferior orbital floor depressed fracture, left  periorbital superficial soft tissue facial swelling. Stable  configuration of the left medial, inferior rectus muscles. No  substantial proptosis or intraparenchymal hemorrhage. Stable blood  filling the left maxillary sinus. Clear mastoid air cells. Normal  right orbit.      Impression    IMPRESSION:  1. No substantial change in expected evolution of bilateral  subarachnoid hemorrhages. No midline shift, mass effect or  hydrocephalus.  2. Stable depressed left inferior orbital floor fracture, blood  filling the left maxillary sinus and soft tissue swelling around the  orbit.    I have personally reviewed the examination and initial interpretation  and I agree with the findings.    BRIDGER VASQUEZ MD         SYSTEM ID:  [id number]       Time Spent on this Encounter   I, BITA Jean CNP, personally saw the patient today and spent greater than 30 minutes discharging this patient.    We appreciate the opportunity to care for your patient while in the hospital.  Should you have any questions about their injuries or this discharge summary our contact information is below.    Trauma Services  [facility]   Department of Critical Care and Acute Care Surgery  [address] [place] [id number]  Office: [phone]

## 2023-06-14 ENCOUNTER — PATIENT OUTREACH (OUTPATIENT)
Dept: CARE COORDINATION | Facility: CLINIC | Age: 78
End: 2023-06-14
Payer: MEDICARE

## 2023-06-14 NOTE — PROGRESS NOTES
Clinic Care Coordination Contact  [facility]: Post-Discharge Note  SITUATION                                                      Admission:    Admission Date: 06/10/23   Reason for Admission: Fall, initial encounter  SAH (subarachnoid hemorrhage) (H)  Orbital floor (blow-out) closed fracture (H)  Discharge:   Discharge Date: 06/13/23  Discharge Diagnosis: Fall, initial encounter  Orbital floor (blow-out) closed fracture (H)  Traumatic subarachnoid hemorrhage with unknown loss of consciousness status, initial encounter (H)  Contusion of face, initial encounter    BACKGROUND                                                      Per hospital discharge summary and inpatient provider notes:    Argelia Villarreal is a 78 year old female who is presenting from outside hospital after a mechanical fall with a subarachnoid hemorrhage and orbital fracture.  She is sent here with concern for her orbital fracture.  Currently she has no visual changes.  No double vision.  Has some headache but it is mild at this point does not want pain medications.  She is on aspirin otherwise no blood thinners.    ASSESSMENT           Discharge Assessment  How are you doing now that you are home?: Patient states she is tried. Symtpoms about the same as they were on discharge, denies new or worsening symptoms.  How are your symptoms? (Red Flag symptoms escalate to triage hotline per guidelines): Unchanged  Do you feel your condition is stable enough to be safe at home until your provider visit?: Yes  Does the patient have their discharge instructions? : Yes  Does the patient have questions regarding their discharge instructions? : No  Were you started on any new medications or were there changes to any of your previous medications? : Yes  Does the patient have all of their medications?: Yes  Do you have questions regarding any of your medications? : No  Do you have all of your needed medical supplies or equipment (DME)?  (i.e. oxygen  tank, CPAP, cane, etc.): Yes  Discharge follow-up appointment scheduled within 14 calendar days? : Yes  Discharge Follow Up Appointment Date: 06/21/23  Discharge Follow Up Appointment Scheduled with?: Primary Care Provider         Post-op (Clinicians Only)  Did the patient have surgery or a procedure: No        PLAN                                                      Outpatient Plan:  Follow up with your primary care provider for continued medical care and hospital follow up in 5-10 days.    Neurosurgery Clinic in 2weeks with a CT head    [facility]  Floor 3  [address] [place] [id number]  Appointments: [phone]  Ophthalmology (Eye) Clinic with the Occuloplastics team in 7 days, they will call to schedule  [facility]  Floor 4  [address] [place] [address]  Appointments: [phone]    Future Appointments   Date Time Provider Department Center   6/16/2023 11:15 AM Bandar Pierson MD [facility]   6/26/2023  2:20 PM [address]   6/28/2023  2:00 PM [facility]         For any urgent concerns, please contact our 24 hour nurse triage line: [phone] ([phone])         Luana Lara RN

## 2023-06-16 ENCOUNTER — OFFICE VISIT (OUTPATIENT)
Dept: OPHTHALMOLOGY | Facility: CLINIC | Age: 78
End: 2023-06-16
Payer: MEDICARE

## 2023-06-16 ENCOUNTER — PRE VISIT (OUTPATIENT)
Dept: OPHTHALMOLOGY | Facility: CLINIC | Age: 78
End: 2023-06-16

## 2023-06-16 VITALS — WEIGHT: 182 LBS | BODY MASS INDEX: 32.25 KG/M2 | HEIGHT: 63 IN

## 2023-06-16 DIAGNOSIS — S02.30XA ORBITAL FLOOR (BLOW-OUT) CLOSED FRACTURE (H): Primary | ICD-10-CM

## 2023-06-16 PROCEDURE — 99203 OFFICE O/P NEW LOW 30 MIN: CPT | Mod: GC | Performed by: OPHTHALMOLOGY

## 2023-06-16 PROCEDURE — 92285 EXTERNAL OCULAR PHOTOGRAPHY: CPT | Mod: GC | Performed by: OPHTHALMOLOGY

## 2023-06-16 ASSESSMENT — VISUAL ACUITY
OS_SC: 20/40
METHOD: SNELLEN - LINEAR
OD_SC: 20/40

## 2023-06-16 ASSESSMENT — CONF VISUAL FIELD
OD_NORMAL: 1
OD_INFERIOR_TEMPORAL_RESTRICTION: 0
OD_INFERIOR_NASAL_RESTRICTION: 0
OD_SUPERIOR_NASAL_RESTRICTION: 0
OS_NORMAL: 1
OD_SUPERIOR_TEMPORAL_RESTRICTION: 0
OS_INFERIOR_TEMPORAL_RESTRICTION: 0
METHOD: COUNTING FINGERS
OS_SUPERIOR_TEMPORAL_RESTRICTION: 0
OS_SUPERIOR_NASAL_RESTRICTION: 0
OS_INFERIOR_NASAL_RESTRICTION: 0

## 2023-06-16 ASSESSMENT — TONOMETRY
OD_IOP_MMHG: 15
IOP_METHOD: ICARE
OS_IOP_MMHG: 15

## 2023-06-16 ASSESSMENT — EXTERNAL EXAM - RIGHT EYE: OD_EXAM: NORMAL

## 2023-06-16 NOTE — PROGRESS NOTES
Chief Complaints and History of Present Illnesses   Patient presents with     Consult For     Orbital fracture      Chief Complaint(s) and History of Present Illness(es)     Consult For    In left eye.  Associated symptoms include tearing.  Negative for eye pain   and discharge.  Treatments tried include ointment.  Pain was noted as   10/10. Additional comments: Orbital fracture            Comments    Patient reports fracture occurred on 6/10/23 with a fall.   She states she is not in any pain.   Is not having any double vision.   Using ointment around left eye so it some times blurry.   Tearing more since the injury but no matter.     Intermittent headaches since injury. Moving around intensifies this. Pain   of 10 with headaches when present. Pain does not involve left eye.     Patient present today with her daughter Janiya. Also treating presently for Metastatic breast cancer.     Lilian Montoya, COT COT 11:26 AM June 16, 2023       Completed Medrol dose pack. Has few days left of antibiotics. Denies double vision. Does have some left eye blurry vision from the ointment.       Assessment & Plan     Argelia Villarreal is a 78 year old female with the following diagnoses:   1. Orbital floor (blow-out) closed fracture (H)       Large left inferior orbital fracture. No double vision. Slightly limited upgaze left eye. Normal V1/V2 sensation. No enophthalmos. Will monitor for now.  - stop erythromycin ointment to lids  - cont ointment to cheek abrasions  - start warm compresses  - follow up as needed in plastics  - seeing Dr. Andres (regular eye doctor) in August          Attending Physician Attestation:  Complete documentation of historical and exam elements from today's encounter can be found in the full encounter summary report (not reduplicated in this progress note).  I personally obtained the chief complaint(s) and history of present illness.  I confirmed and edited as necessary the review of systems, past  medical/surgical history, family history, social history, and examination findings as documented by others; and I examined the patient myself.  I personally reviewed the relevant tests, images, and reports as documented above.  I formulated and edited as necessary the assessment and plan and discussed the findings and management plan with the patient and family.  -Bandar Pierson MD  11:35 AM 6/16/2023

## 2023-06-16 NOTE — NURSING NOTE
Chief Complaints and History of Present Illnesses   Patient presents with     Consult For     Orbital fracture      Chief Complaint(s) and History of Present Illness(es)     Consult For            Laterality: left eye    Associated symptoms: tearing.  Negative for eye pain and discharge    Treatments tried: ointment    Pain scale: 10/10    Comments: Orbital fracture           Comments    Patient reports fracture occurred on 6/10/23 with a fall.   She states she is not in any pain.   Is not having any double vision.   Using ointment around left eye so it some times blurry.   Tearing more since the injury but no matter.     Intermittent headaches since injury. Moving around intensifies this. Pain of 10 with headaches when present. Pain does not involve left eye.     Patient present today with her daughter Janiya. Also treating presently for Metastatic breast cancer.     Lilian Montoya, COT COT 11:26 AM June 16, 2023

## 2023-06-16 NOTE — LETTER
2023         RE:  :  MRN: Argelia Villarreal  1945  [id number]     Dear Mo.     Thank you for asking me to see your patient, Argelia Villarreal, for an oculoplastic   consultation.  My assessment and plan are below.  For further details, please see my attached clinic note.      Assessment & Plan     Argelia Villarreal is a 78 year old female with the following diagnoses:   1. Orbital floor (blow-out) closed fracture (H)       Large left inferior orbital fracture. No double vision. Slightly limited upgaze left eye. Normal V1/V2 sensation. No enophthalmos. Will monitor for now.  - stop erythromycin ointment to lids  - cont ointment to cheek abrasions  - start warm compresses  - follow up as needed in plastics  - seeing Dr. Andres (regular eye doctor) in August        Again, thank you for allowing me to participate in the care of your patient.      Sincerely,    Bandar Pierson MD  Department of Ophthalmology and Visual Neurosciences  [facility]    CC: Richard Boyd MD  [address] [facility] [address]  Via In Basket     Arely Andres MD  [facility]  [address]   [facility] [address]  Via Fax: [phone]

## 2023-06-16 NOTE — NURSING NOTE
Chief Complaints and History of Present Illnesses   Patient presents with     Consult For     Orbital fracture      Chief Complaint(s) and History of Present Illness(es)     Consult For            Laterality: left eye    Associated symptoms: tearing.  Negative for eye pain and discharge    Treatments tried: ointment    Pain scale: 10/10    Comments: Orbital fracture           Comments    Patient reports fracture occurred on 6/10/23 with a fall.   She states she is not in any pain.   Is not having any double vision.   Using ointment around left eye so it some times blurry.   Tearing more since the injury but no matter.     Intermittent headaches since injury. Moving around intensifies this. Pain of 10 with headaches when present. Pain does not involve left eye.     Patient present today with her daughter Janiya.     Lilian Montoya, COT COT 11:26 AM June 16, 2023

## 2023-06-19 NOTE — PROVIDER NOTIFICATION
Coding Addendum to H&P dated 6/9    Based on the indications below, please clarify if there was treatment for or clinical significance of any of the following diagnoses by documenting accordingly in the HISTORY & PHYSICAL of the patient's clinical record.       Pancytopenia due to Palbociclib  Cell counts were monitored.

## 2023-06-24 NOTE — PROGRESS NOTES
Progress note addendum for coding query 6/10 for H&P:    Pancytopenia due to Palbociclib  Pancytopenias, treatment related...Currently undergoing therapy with Palbociclib.  -cbc monitored given SDH

## 2023-06-26 ENCOUNTER — HOSPITAL ENCOUNTER (OUTPATIENT)
Dept: CT IMAGING | Facility: CLINIC | Age: 78
Discharge: HOME | End: 2023-06-26
Attending: STUDENT IN AN ORGANIZED HEALTH CARE EDUCATION/TRAINING PROGRAM | Admitting: STUDENT IN AN ORGANIZED HEALTH CARE EDUCATION/TRAINING PROGRAM
Payer: MEDICARE

## 2023-06-26 DIAGNOSIS — I60.9 SUBARACHNOID HEMORRHAGE (H): ICD-10-CM

## 2023-06-26 PROCEDURE — 70450 CT HEAD/BRAIN W/O DYE: CPT | Mod: 26 | Performed by: RADIOLOGY

## 2023-06-26 PROCEDURE — G1010 CDSM STANSON: HCPCS | Performed by: RADIOLOGY

## 2023-06-26 PROCEDURE — G1010 CDSM STANSON: HCPCS

## 2023-06-28 ENCOUNTER — VIRTUAL VISIT (OUTPATIENT)
Dept: NEUROSURGERY | Facility: CLINIC | Age: 78
End: 2023-06-28
Attending: NURSE PRACTITIONER
Payer: MEDICARE

## 2023-06-28 DIAGNOSIS — I60.9 SAH (SUBARACHNOID HEMORRHAGE) (H): Primary | ICD-10-CM

## 2023-06-28 PROCEDURE — 99203 OFFICE O/P NEW LOW 30 MIN: CPT | Performed by: NURSE PRACTITIONER

## 2023-06-28 NOTE — LETTER
6/28/2023      RE: Argelia Villarreal  [address] [facility] [address]     Dear Colleague,    Thank you for the opportunity to participate in the care of your patient, Argelia Villarreal, at the [facility] at [facility]. Please see a copy of my visit note below.         Video visit start: 213pm  Video visit end: 227pm  Pediatric Neurosurgery Clinic    Thank you for referring Argelia Villarreal to the pediatric neurosurgery clinic at the [facility].   I had the opportunity to meet with Argelia Villarreal and parents on June 28, 2023.    As you know, Argelia is a 78 year old female with metastatic breast cancer with CKD III, on ASA, who presented to ED on 9/9/2023 after a fall. She notes she was walking to get the mail when she fell and hit her head, without LOC. Imaging revealed scattered multifocal SAH, left orbital floor blowout fracture, left periorbital ecchymosis, left maxillary sinus hemorrhage. She presents today for 2 week follow up with CT head prior. She was on aspirin but she is now off, her doctor multiple years ago told her to take a baby aspirin every day, no cardiac etiology. No recent falls. She notes occasional headaches, typically on her left side. She notes she will get headaches when she is sitting and stands quickly or by the end of the day she will have headaches. She notes this is new since her fall. She notes resting or tylenol are helpful for her headaches. She denies any vision changes. She notes with her orbital fracture ecchymosis has improved and she is not experiencing pain. She notes occasional weakness when she first gets up in the morning, she notes it is a generalized weakness. She denies any numbness of extremities but notes numbness around her eye and left cheek. She notes she is eating well, without vomiting. She is sleeping well and  awake and active during the day, she enjoys walking her dog and enjoys cooking and cleaning. No seizure activity, has not taken Keppra in 10 days.       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    ALLERGIES:  Influenza vaccines, Diltiazem, Lisinopril, and Morphine    Current Outpatient Medications   Medication Sig Dispense Refill    acetaminophen (TYLENOL) 325 MG tablet Take 3 tablets (975 mg) by mouth 3 times daily 30 tablet 0    Ascorbic Acid (VITAMIN C) 500 MG CAPS Take 500 mg by mouth At Bedtime      atenolol (TENORMIN) 25 MG tablet Take 25 mg by mouth daily      Biotin 88780 MCG TABS Take 1 tablet by mouth 2 times daily      BLACK ELDERBERRY PO Take 100 mg by mouth daily      cholecalciferol (VITAMIN D3) 25 mcg (1000 units) capsule Take 1 capsule by mouth daily      co-enzyme Q-10 100 MG CAPS capsule Take 100 mg by mouth daily      Cranberry-Cholecalciferol 4200-500 MG-UNIT CAPS Take 1 capsule by mouth daily      cyanocobalamin (VITAMIN B-12) 1000 MCG tablet Take 1,000 mcg by mouth daily      doxycycline monohydrate (MONODOX) 100 MG capsule Take 100 mg by mouth daily as needed (when on chemo)      ferrous sulfate (FEROSUL) 325 (65 Fe) MG tablet Take 325 mg by mouth 2 times daily      fish oil-omega-3 fatty acids 1000 MG capsule Take 1 g by mouth every morning      Folic Acid (FOLATE PO) Take 800 mcg by mouth 2 times daily      Garlic 1000 MG CAPS Take 1 capsule by mouth daily      levETIRAcetam (KEPPRA) 750 MG tablet Take 1 tablet (750 mg) by mouth 2 times daily for 5 days 10 tablet 0    Lidocaine (LIDOCARE) 4 % Patch Place 1 patch onto the skin every 24 hours To prevent lidocaine toxicity, patient should be patch free for 12 hrs daily. 10 patch 0    losartan (COZAAR) 50 MG tablet Take 50 mg by mouth daily      magnesium 250 MG tablet Take 1 tablet by mouth daily      Multiple  Vitamins-Minerals (ICAPS AREDS 2 PO) Take 1 capsule by mouth 2 times daily      palbociclib (IBRANCE) 100 MG capsule Take 100 mg by mouth daily with food 21 days on  7 days off      Probiotic Product (PROBIOTIC-10 PO) Take 1 tablet by mouth daily      senna-docusate (SENOKOT-S/PERICOLACE) 8.6-50 MG tablet Take 2 tablets by mouth 2 times daily 12 tablet 0    triamterene-HCTZ (MAXZIDE-25) 37.5-25 MG tablet Take 1 tablet by mouth daily      verapamil ER (CALAN-SR) 180 MG CR tablet Take 180 mg by mouth At Bedtime         Family History   Problem Relation Age of Onset    Macular Degeneration Mother        Social History     Tobacco Use    Smoking status: Former     Types: Cigarettes    Smokeless tobacco: Never   Substance Use Topics    Alcohol use: Not on file       PHYSICAL EXAM:   There were no vitals taken for this visit.  Awake and alert, no acute distress  EOMi, moves all extremities  Exam limited due to the nature of the visit    IMAGES:   CT head reviewed with patient and her daughter  Impression:  1. No acute intracranial pathology or new acute intracranial hemorrhage.  2. Expected evolution and decrease of scattered bilateral subarachnoid hemorrhages.  3. Heterogeneous sclerosis of the skull base, suspicious for metastatic disease.    ASSESSMENT:  Argelia Villarreal, 78 year old female with metastatic breast cancer with CKD III, who presented to ED on 6/9/2023 after a fall, imaging revealed scattered multifocal SAH, left orbital floor blowout fracture, left periorbital ecchymosis, left maxillary sinus hemorrhage. She continues with some headaches, but notes overall improvement    PLAN:  - reached out to [facility] Dr. Maylin Lee to relay CT head results  -KISHA follow up in 3 months with CT head prior or sooner should she have any clinical concerns or worsening headaches, we discussed signs and symptoms of when to present to the emergency department and/or clinic  - Argelia Ying Phil and family  were counseled to please contact us with any acute worsening of symptoms, or with any questions or concerns.     This patient was discussed with neurosurgery faculty, who agrees with the above.  Shanon Engel NP on 6/28/2023 at 2:06 PM        Please do not hesitate to contact me if you have any questions/concerns.     Sincerely,       Shanon Engel NP

## 2023-06-28 NOTE — PROGRESS NOTES
Video visit start: 213pm  Video visit end: 227pm  Pediatric Neurosurgery Clinic    Thank you for referring Argelia Villarreal to the pediatric neurosurgery clinic at the [facility].   I had the opportunity to meet with Argelia Villarreal and parents on June 28, 2023.    As you know, Argelia is a 78 year old female with metastatic breast cancer with CKD III, on ASA, who presented to ED on 9/9/2023 after a fall. She notes she was walking to get the mail when she fell and hit her head, without LOC. Imaging revealed scattered multifocal SAH, left orbital floor blowout fracture, left periorbital ecchymosis, left maxillary sinus hemorrhage. She presents today for 2 week follow up with CT head prior. She was on aspirin but she is now off, her doctor multiple years ago told her to take a baby aspirin every day, no cardiac etiology. No recent falls. She notes occasional headaches, typically on her left side. She notes she will get headaches when she is sitting and stands quickly or by the end of the day she will have headaches. She notes this is new since her fall. She notes resting or tylenol are helpful for her headaches. She denies any vision changes. She notes with her orbital fracture ecchymosis has improved and she is not experiencing pain. She notes occasional weakness when she first gets up in the morning, she notes it is a generalized weakness. She denies any numbness of extremities but notes numbness around her eye and left cheek. She notes she is eating well, without vomiting. She is sleeping well and awake and active during the day, she enjoys walking her dog and enjoys cooking and cleaning. No seizure activity, has not taken Keppra in 10 days.       Past Medical History:   Diagnosis Date     Breast cancer metastasized to bone, unspecified laterality (H)      Macular degeneration        No past surgical history on file.    ALLERGIES:  Influenza vaccines,  Diltiazem, Lisinopril, and Morphine    Current Outpatient Medications   Medication Sig Dispense Refill     acetaminophen (TYLENOL) 325 MG tablet Take 3 tablets (975 mg) by mouth 3 times daily 30 tablet 0     Ascorbic Acid (VITAMIN C) 500 MG CAPS Take 500 mg by mouth At Bedtime       atenolol (TENORMIN) 25 MG tablet Take 25 mg by mouth daily       Biotin 70913 MCG TABS Take 1 tablet by mouth 2 times daily       BLACK ELDERBERRY PO Take 100 mg by mouth daily       cholecalciferol (VITAMIN D3) 25 mcg (1000 units) capsule Take 1 capsule by mouth daily       co-enzyme Q-10 100 MG CAPS capsule Take 100 mg by mouth daily       Cranberry-Cholecalciferol 4200-500 MG-UNIT CAPS Take 1 capsule by mouth daily       cyanocobalamin (VITAMIN B-12) 1000 MCG tablet Take 1,000 mcg by mouth daily       doxycycline monohydrate (MONODOX) 100 MG capsule Take 100 mg by mouth daily as needed (when on chemo)       ferrous sulfate (FEROSUL) 325 (65 Fe) MG tablet Take 325 mg by mouth 2 times daily       fish oil-omega-3 fatty acids 1000 MG capsule Take 1 g by mouth every morning       Folic Acid (FOLATE PO) Take 800 mcg by mouth 2 times daily       Garlic 1000 MG CAPS Take 1 capsule by mouth daily       levETIRAcetam (KEPPRA) 750 MG tablet Take 1 tablet (750 mg) by mouth 2 times daily for 5 days 10 tablet 0     Lidocaine (LIDOCARE) 4 % Patch Place 1 patch onto the skin every 24 hours To prevent lidocaine toxicity, patient should be patch free for 12 hrs daily. 10 patch 0     losartan (COZAAR) 50 MG tablet Take 50 mg by mouth daily       magnesium 250 MG tablet Take 1 tablet by mouth daily       Multiple Vitamins-Minerals (ICAPS AREDS 2 PO) Take 1 capsule by mouth 2 times daily       palbociclib (IBRANCE) 100 MG capsule Take 100 mg by mouth daily with food 21 days on  7 days off       Probiotic Product (PROBIOTIC-10 PO) Take 1 tablet by mouth daily       senna-docusate (SENOKOT-S/PERICOLACE) 8.6-50 MG tablet Take 2 tablets by mouth 2 times  daily 12 tablet 0     triamterene-HCTZ (MAXZIDE-25) 37.5-25 MG tablet Take 1 tablet by mouth daily       verapamil ER (CALAN-SR) 180 MG CR tablet Take 180 mg by mouth At Bedtime         Family History   Problem Relation Age of Onset     Macular Degeneration Mother        Social History     Tobacco Use     Smoking status: Former     Types: Cigarettes     Smokeless tobacco: Never   Substance Use Topics     Alcohol use: Not on file       PHYSICAL EXAM:   There were no vitals taken for this visit.  Awake and alert, no acute distress  EOMi, moves all extremities  Exam limited due to the nature of the visit    IMAGES:   CT head reviewed with patient and her daughter  Impression:  1. No acute intracranial pathology or new acute intracranial hemorrhage.  2. Expected evolution and decrease of scattered bilateral subarachnoid hemorrhages.  3. Heterogeneous sclerosis of the skull base, suspicious for metastatic disease.    ASSESSMENT:  Argelia Villarreal, 78 year old female with metastatic breast cancer with CKD III, who presented to ED on 6/9/2023 after a fall, imaging revealed scattered multifocal SAH, left orbital floor blowout fracture, left periorbital ecchymosis, left maxillary sinus hemorrhage. She continues with some headaches, but notes overall improvement    PLAN:  - reached out to [facility] Dr. Maylin Lee to relay CT head results  -KISHA follow up in 3 months with CT head prior or sooner should she have any clinical concerns or worsening headaches, we discussed signs and symptoms of when to present to the emergency department and/or clinic  - Argelia Villarreal and family were counseled to please contact us with any acute worsening of symptoms, or with any questions or concerns.     This patient was discussed with neurosurgery faculty, who agrees with the above.  Shanon Engel NP on 6/28/2023 at 2:06 PM

## 2023-09-20 ENCOUNTER — VIRTUAL VISIT (OUTPATIENT)
Dept: NEUROSURGERY | Facility: CLINIC | Age: 78
End: 2023-09-20
Attending: NURSE PRACTITIONER
Payer: MEDICARE

## 2023-09-20 VITALS — WEIGHT: 181 LBS | HEIGHT: 64 IN | BODY MASS INDEX: 30.9 KG/M2

## 2023-09-20 DIAGNOSIS — I60.9 SUBARACHNOID HEMORRHAGE (H): Primary | ICD-10-CM

## 2023-09-20 PROCEDURE — 99213 OFFICE O/P EST LOW 20 MIN: CPT | Mod: VID | Performed by: NURSE PRACTITIONER

## 2023-09-20 ASSESSMENT — PAIN SCALES - GENERAL: PAINLEVEL: NO PAIN (0)

## 2023-09-20 NOTE — NURSING NOTE
Is the patient currently in the state of [place]? YES    Visit mode:VIDEO    If the visit is dropped, the patient can be reconnected by: VIDEO VISIT: Send to e-mail at: [email or web address]    Will anyone else be joining the visit? NO  (If patient encounters technical issues they should call [phone])    How would you like to obtain your AVS? Mail a copy    Are changes needed to the allergy or medication list? Yes    Please remove any meds marked not taking and any flagged for removal.    Reason for visit: RECHECK    Wt/ht other than 24 hrs:    Pain more than one location:  no  Gayla TOSCANO

## 2023-09-20 NOTE — PROGRESS NOTES
Neurosurgery Clinic    Thank you for referring Argelia Villarreal to the neurosurgery clinic at the [facility].   I had the opportunity to meet with Argelia Villarreal on September 20, 2023.    As you know, Argelia is a 78 year old female with metastatic breast cancer with CKD III, on ASA, who presented to ED on 9/9/2023 after a fall. She notes she was walking to get the mail when she fell and hit her head, without LOC. Imaging revealed scattered multifocal SAH, left orbital floor blowout fracture, left periorbital ecchymosis, left maxillary sinus hemorrhage. She presents today for ongoing follow up.  She denies any headaches. She notes she has been to the eye doctor and she said everything has been stable, she has no vision changes. She denies any numbness/tingling, weakness or any changes. She is not on aspirin. 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    ALLERGIES:  Influenza vaccines, Diltiazem, Lisinopril, and Morphine    Current Outpatient Medications   Medication Sig Dispense Refill    Ascorbic Acid (VITAMIN C) 500 MG CAPS Take 500 mg by mouth At Bedtime      atenolol (TENORMIN) 25 MG tablet Take 25 mg by mouth daily      Biotin 75562 MCG TABS Take 1 tablet by mouth 2 times daily      BLACK ELDERBERRY PO Take 100 mg by mouth daily      cholecalciferol (VITAMIN D3) 25 mcg (1000 units) capsule Take 1 capsule by mouth daily      co-enzyme Q-10 100 MG CAPS capsule Take 100 mg by mouth daily      Cranberry-Cholecalciferol 4200-500 MG-UNIT CAPS Take 1 capsule by mouth daily      cyanocobalamin (VITAMIN B-12) 1000 MCG tablet Take 1,000 mcg by mouth daily      doxycycline monohydrate (MONODOX) 100 MG capsule Take 100 mg by mouth daily as needed (when on chemo)      ferrous  "sulfate (FEROSUL) 325 (65 Fe) MG tablet Take 325 mg by mouth 2 times daily      fish oil-omega-3 fatty acids 1000 MG capsule Take 1 g by mouth every morning      Folic Acid (FOLATE PO) Take 800 mcg by mouth 2 times daily      Garlic 1000 MG CAPS Take 1 capsule by mouth daily      losartan (COZAAR) 50 MG tablet Take 50 mg by mouth daily      magnesium 250 MG tablet Take 1 tablet by mouth daily      palbociclib (IBRANCE) 100 MG capsule Take 100 mg by mouth daily with food 21 days on  7 days off      Probiotic Product (PROBIOTIC-10 PO) Take 1 tablet by mouth daily      senna-docusate (SENOKOT-S/PERICOLACE) 8.6-50 MG tablet Take 2 tablets by mouth 2 times daily 12 tablet 0    triamterene-HCTZ (MAXZIDE-25) 37.5-25 MG tablet Take 1 tablet by mouth daily      verapamil ER (CALAN-SR) 180 MG CR tablet Take 180 mg by mouth At Bedtime      acetaminophen (TYLENOL) 325 MG tablet Take 3 tablets (975 mg) by mouth 3 times daily (Patient not taking: Reported on 9/20/2023) 30 tablet 0    levETIRAcetam (KEPPRA) 750 MG tablet Take 1 tablet (750 mg) by mouth 2 times daily for 5 days 10 tablet 0    Lidocaine (LIDOCARE) 4 % Patch Place 1 patch onto the skin every 24 hours To prevent lidocaine toxicity, patient should be patch free for 12 hrs daily. (Patient not taking: Reported on 9/20/2023) 10 patch 0    Multiple Vitamins-Minerals (ICAPS AREDS 2 PO) Take 1 capsule by mouth 2 times daily (Patient not taking: Reported on 9/20/2023)         Family History   Problem Relation Age of Onset    Macular Degeneration Mother        Social History     Tobacco Use    Smoking status: Former     Types: Cigarettes    Smokeless tobacco: Never   Substance Use Topics    Alcohol use: Not on file         PHYSICAL EXAM:   Ht 1.613 m (5' 3.5\")   Wt 82.1 kg (181 lb)   BMI 31.56 kg/m    Examination limited due to the nature of the visit  Answering questions appropriately, no acute distress  Moving all extremities    IMAGES: none    ASSESSMENT:  Argelia He " Phil, 78 year old female with metastatic breast cancer with CKD III, s/p fall many months ago with SAH, now neurologically stable and progressing well    PLAN:  - we can see Argelia on an as needed basis  - Argelia Villarreal and family were counseled to please contact us with any acute worsening of symptoms, or with any questions or concerns.       Shanon Engel NP  Department of Neurosurgery  [phone]    This patient was discussed with neurosurgery faculty, who agrees with the above.  Shanon Engel NP on 9/20/2023 at 8:56 AM